# Patient Record
Sex: MALE | Race: WHITE | Employment: FULL TIME | URBAN - METROPOLITAN AREA
[De-identification: names, ages, dates, MRNs, and addresses within clinical notes are randomized per-mention and may not be internally consistent; named-entity substitution may affect disease eponyms.]

---

## 2017-04-02 DIAGNOSIS — E78.2 MIXED HYPERLIPIDEMIA: ICD-10-CM

## 2017-04-03 RX ORDER — SIMVASTATIN 40 MG
TABLET ORAL
Qty: 90 TABLET | Refills: 0 | OUTPATIENT
Start: 2017-04-03

## 2017-04-03 NOTE — TELEPHONE ENCOUNTER
Refused Prescriptions:                       Disp   Refills    simvastatin (ZOCOR) 40 MG tablet [Pharmacy*90 tab*0        Sig: TAKE 1 TABLET(40 MG) BY MOUTH AT BEDTIME  Refused By: PAUL OAKES  Reason for Refusal: Appt required, please call patient    Pt last seen a year ago.   Pt due for ov  Cecilees  [phone] (home) [phone] (work)

## 2017-04-15 ENCOUNTER — OFFICE VISIT (OUTPATIENT)
Dept: FAMILY MEDICINE | Facility: CLINIC | Age: 47
End: 2017-04-15

## 2017-04-15 VITALS
BODY MASS INDEX: 28.99 KG/M2 | OXYGEN SATURATION: 98 % | RESPIRATION RATE: 16 BRPM | TEMPERATURE: 98.4 F | HEART RATE: 72 BPM | DIASTOLIC BLOOD PRESSURE: 72 MMHG | HEIGHT: 65 IN | WEIGHT: 174 LBS | SYSTOLIC BLOOD PRESSURE: 112 MMHG

## 2017-04-15 DIAGNOSIS — Z71.89 ACP (ADVANCE CARE PLANNING): ICD-10-CM

## 2017-04-15 DIAGNOSIS — Z80.42 FH: PROSTATE CANCER: ICD-10-CM

## 2017-04-15 DIAGNOSIS — E78.2 MIXED HYPERLIPIDEMIA: Primary | ICD-10-CM

## 2017-04-15 PROCEDURE — 84153 ASSAY OF PSA TOTAL: CPT | Mod: 90 | Performed by: FAMILY MEDICINE

## 2017-04-15 PROCEDURE — 99396 PREV VISIT EST AGE 40-64: CPT | Performed by: FAMILY MEDICINE

## 2017-04-15 PROCEDURE — 36415 COLL VENOUS BLD VENIPUNCTURE: CPT | Performed by: FAMILY MEDICINE

## 2017-04-15 PROCEDURE — 80061 LIPID PANEL: CPT | Mod: 90 | Performed by: FAMILY MEDICINE

## 2017-04-15 PROCEDURE — 80053 COMPREHEN METABOLIC PANEL: CPT | Mod: 90 | Performed by: FAMILY MEDICINE

## 2017-04-15 RX ORDER — SIMVASTATIN 40 MG
40 TABLET ORAL AT BEDTIME
Qty: 90 TABLET | Refills: 3 | Status: SHIPPED | OUTPATIENT
Start: 2017-04-15 | End: 2018-05-22

## 2017-04-15 NOTE — MR AVS SNAPSHOT
After Visit Summary   4/15/2017    Yong Bryson    MRN: [id number]           Patient Information     Date Of Birth          1970        Visit Information        Provider Department      4/15/2017 9:45 AM Zane Zamorano MD [facility]        Today's Diagnoses     Mixed hyperlipidemia    -  1    FH: prostate cancer        ACP (advance care planning)           Follow-ups after your visit        Follow-up notes from your care team     Return in about 1 year (around 4/15/2018).      Who to contact     If you have questions or need follow up information about today's clinic visit or your schedule please contact [facility] directly at [phone].  Normal or non-critical lab and imaging results will be communicated to you by HolyTransactionhart, letter or phone within 4 business days after the clinic has received the results. If you do not hear from us within 7 days, please contact the clinic through HolyTransactionhart or phone. If you have a critical or abnormal lab result, we will notify you by phone as soon as possible.  Submit refill requests through 800APP or call your pharmacy and they will forward the refill request to us. Please allow 3 business days for your refill to be completed.          Additional Information About Your Visit        MyChart Information     800APP gives you secure access to your electronic health record. If you see a primary care provider, you can also send messages to your care team and make appointments. If you have questions, please call your primary care clinic.  If you do not have a primary care provider, please call [phone] and they will assist you.        Care EveryWhere ID     This is your Care EveryWhere ID. This could be used by other organizations to access your [place] medical records  [id number]        Your Vitals Were     Pulse Temperature Respirations Height Pulse Oximetry BMI (Body Mass Index)    72 98.4  F (36.9  " C) (Oral) 16 1.651 m (5' 5\") 98% 28.96 kg/m2       Blood Pressure from Last 3 Encounters:   04/15/17 112/72   03/09/16 118/80   12/23/14 140/90    Weight from Last 3 Encounters:   04/15/17 78.9 kg (174 lb)   03/09/16 79.5 kg (175 lb 4 oz)   12/23/14 78.1 kg (172 lb 3.2 oz)              We Performed the Following     COMPREHENSIVE METABOLIC PANEL (QUEST) XCMP     HCL PSA, SCREENING (QUEST)     Lipid Profile (QUEST)     VENOUS COLLECTION          Where to get your medicines      These medications were sent to [facility] [id number] - [address] AT [facility] [address]  [address] [place] [address] [address], [facility] [address]     Phone:  [phone]     simvastatin 40 MG tablet          Primary Care Provider Office Phone # Fax #    Zane Zamorano [phone]       [facility] [address] [facility]   [facility] [address]        Thank you!     Thank you for choosing [facility]  for your care. Our goal is always to provide you with excellent care. Hearing back from our patients is one way we can continue to improve our services. Please take a few minutes to complete the written survey that you may receive in the mail after your visit with us. Thank you!             Your Updated Medication List - Protect others around you: Learn how to safely use, store and throw away your medicines at www.disposemymeds.org.          This list is accurate as of: 4/15/17  9:52 AM.  Always use your most recent med list.                   Brand Name Dispense Instructions for use    simvastatin 40 MG tablet    ZOCOR    90 tablet    Take 1 tablet (40 mg) by mouth At Bedtime         "

## 2017-04-15 NOTE — PROGRESS NOTES
SUBJECTIVE:                                                    Yong Bryson is a 46 year old male who presents to clinic today for the following health issues:        Hyperlipidemia Follow-Up      Rate your low fat/cholesterol diet?: good    Taking statin?  Yes, no muscle aches from statin    Other lipid medications/supplements?:  none       Amount of exercise or physical activity: 4-5 days/week for an average of 15-30 minutes    Problems taking medications regularly: No    Medication side effects: none    Diet: regular (no restrictions)          Problem list and histories reviewed & adjusted, as indicated.  Additional history: as documented    Patient Active Problem List   Diagnosis     Mixed hyperlipidemia     Allergic rhinitis     FAM HX-CARDIOVAS DIS NEC     FHx: celiac disease     Health Care Home     ACP (advance care planning)     FH: prostate cancer     Past Surgical History:   Procedure Laterality Date     PHOTOREFRACTIVE KERATECTOMY  2008    bilateral       Social History   Substance Use Topics     Smoking status: Never Smoker     Smokeless tobacco: Never Used     Alcohol use 10.0 oz/week     20 drink(s) per week      Comment: 2-3 drinks per day     Family History   Problem Relation Age of Onset     Genetic Disorder Mother      Addisons     CEREBROVASCULAR DISEASE Mother      Hypertension Father      Lipids Father      Prostate Cancer Father      DIABETES Maternal Grandfather      GASTROINTESTINAL DISEASE Daughter      celiac     C.A.D. No family hx of          Current Outpatient Prescriptions   Medication Sig Dispense Refill     simvastatin (ZOCOR) 40 MG tablet Take 1 tablet (40 mg) by mouth At Bedtime 90 tablet 3     [DISCONTINUED] simvastatin (ZOCOR) 40 MG tablet Take 1 tablet (40 mg) by mouth At Bedtime 90 tablet 3     Allergies   Allergen Reactions     Sulfa Drugs      Recent Labs   Lab Test  03/12/16   0909  12/23/14   1211  11/13/13   1632  08/26/12   0915   06/17/10   1156  06/19/09   0852   LDL   "73  86  72  88   < >  88  90   HDL  34*  51  45  47   < >  41  46   TRIG  71  98  48  68   < >  108  77   ALT  15  20  18  27   < >  24  27   CR  1.03  1.05  1.06  0.97   < >  1.09  0.96   GFRESTIMATED  89  87  87  86   < >  76  89   GFRESTBLACK   --    --    --   >90   --   >90  >90   POTASSIUM  3.9  3.9  3.8  4.0   < >  4.2  4.5   TSH   --    --    --    --    --   2.18  1.46    < > = values in this interval not displayed.      BP Readings from Last 3 Encounters:   04/15/17 112/72   03/09/16 118/80   12/23/14 140/90    Wt Readings from Last 3 Encounters:   04/15/17 78.9 kg (174 lb)   03/09/16 79.5 kg (175 lb 4 oz)   12/23/14 78.1 kg (172 lb 3.2 oz)                    ROS:  Constitutional, HEENT, cardiovascular, pulmonary, gi and gu systems are negative, except as otherwise noted.    OBJECTIVE:                                                    /72 (BP Location: Left arm, Patient Position: Chair, Cuff Size: Adult Large)  Pulse 72  Temp 98.4  F (36.9  C) (Oral)  Resp 16  Ht 1.651 m (5' 5\")  Wt 78.9 kg (174 lb)  SpO2 98%  BMI 28.96 kg/m2  Body mass index is 28.96 kg/(m^2).   GENERAL: healthy, alert and no distress  NECK: no adenopathy, no asymmetry, masses, or scars and thyroid normal to palpation  RESP: lungs clear to auscultation - no rales, rhonchi or wheezes  CV: regular rate and rhythm, normal S1 S2, no S3 or S4, no murmur, click or rub, no peripheral edema and peripheral pulses strong  ABDOMEN: soft, nontender, no hepatosplenomegaly, no masses and bowel sounds normal  MS: no gross musculoskeletal defects noted, no edema    Diagnostic Test Results:  none      ASSESSMENT:                                                        PLAN:                                                    (E78.2) Mixed hyperlipidemia  (primary encounter diagnosis)  Comment: controlled pending labs  Plan: Lipid Profile (QUEST), COMPREHENSIVE METABOLIC         PANEL (QUEST) XCMP, VENOUS COLLECTION,         simvastatin (ZOCOR) " "40 MG tablet        continue current medications at current doses     (Z80.42) FH: prostate cancer  Comment: father in radiation  Plan: HCL PSA, SCREENING (QUEST), VENOUS COLLECTION        Baseline check in 40's recommended    (Z71.89) ACP (advance care planning)  Comment:   Plan:     BMI:   Estimated body mass index is 28.96 kg/(m^2) as calculated from the following:    Height as of this encounter: 1.651 m (5' 5\").    Weight as of this encounter: 78.9 kg (174 lb).   Weight management plan: Discussed healthy diet and exercise guidelines and patient will follow up in 12 months in clinic to re-evaluate.      FUTURE APPOINTMENTS:       - Follow-up visit in 12 mo  Work on weight loss  Regular exercise    Zane Zamorano MD  [facility]      "

## 2017-04-15 NOTE — NURSING NOTE
Patient is here for a recheck of their medication.  Pre-Visit Screening :  Immunizations : up to date    Colonoscopy : na  Mammogram : na  Asthma Action Test/Plan : na  PHQ9/GAD7 :  na  Pulse - regular    Medication Reconciliation: complete      CLASSIFICATION OF OVERWEIGHT AND OBESITY BY BMI                         Obesity Class           BMI(kg/m2)  Underweight                                    < 18.5  Normal                                         18.5-24.9  Overweight                                     25.0-29.9  OBESITY                     I                  30.0-34.9                              II                 35.0-39.9  EXTREME OBESITY             III                >40                             Patient's  BMI Body mass index is 28.96 kg/(m^2).  http://hin.nhlbi.nih.gov/menuplanner/menu.cgi  Questioned patient about current smoking habits.  Pt. has never smoked.

## 2017-04-16 LAB
ABBOTT PSA - QUEST: 0.7 NG/ML
ALBUMIN SERPL-MCNC: 4.7 G/DL (ref 3.6–5.1)
ALBUMIN/GLOB SERPL: 1.7 (CALC) (ref 1–2.5)
ALP SERPL-CCNC: 68 U/L (ref 40–115)
ALT SERPL-CCNC: 15 U/L (ref 9–46)
AST SERPL-CCNC: 19 U/L (ref 10–40)
BILIRUB SERPL-MCNC: 0.9 MG/DL (ref 0.2–1.2)
BUN SERPL-MCNC: 11 MG/DL (ref 7–25)
BUN/CREATININE RATIO: NORMAL (CALC) (ref 6–22)
CALCIUM SERPL-MCNC: 9.7 MG/DL (ref 8.6–10.3)
CHLORIDE SERPLBLD-SCNC: 102 MMOL/L (ref 98–110)
CHOLEST SERPL-MCNC: 147 MG/DL (ref 125–200)
CHOLEST/HDLC SERPL: 3.7 (CALC)
CO2 SERPL-SCNC: 28 MMOL/L (ref 20–31)
CREAT SERPL-MCNC: 1.16 MG/DL (ref 0.6–1.35)
EGFR AFRICAN AMERICAN - QUEST: 88 ML/MIN/1.73M2
GFR SERPL CREATININE-BSD FRML MDRD: 76 ML/MIN/1.73M2
GLOBULIN, CALCULATED - QUEST: 2.7 G/DL (CALC) (ref 1.9–3.7)
GLUCOSE - QUEST: 93 MG/DL (ref 65–99)
HDLC SERPL-MCNC: 40 MG/DL
LDLC SERPL CALC-MCNC: 92 MG/DL (CALC)
NONHDLC SERPL-MCNC: 107 MG/DL (CALC)
POTASSIUM SERPL-SCNC: 4 MMOL/L (ref 3.5–5.3)
PROT SERPL-MCNC: 7.4 G/DL (ref 6.1–8.1)
SODIUM SERPL-SCNC: 141 MMOL/L (ref 135–146)
TRIGL SERPL-MCNC: 73 MG/DL

## 2017-08-23 ENCOUNTER — OFFICE VISIT (OUTPATIENT)
Dept: FAMILY MEDICINE | Facility: CLINIC | Age: 47
End: 2017-08-23

## 2017-08-23 VITALS
BODY MASS INDEX: 28.06 KG/M2 | TEMPERATURE: 98.1 F | HEART RATE: 64 BPM | DIASTOLIC BLOOD PRESSURE: 84 MMHG | WEIGHT: 168.4 LBS | OXYGEN SATURATION: 98 % | SYSTOLIC BLOOD PRESSURE: 132 MMHG | HEIGHT: 65 IN

## 2017-08-23 DIAGNOSIS — K64.5 THROMBOSED EXTERNAL HEMORRHOIDS: Primary | ICD-10-CM

## 2017-08-23 PROCEDURE — 99213 OFFICE O/P EST LOW 20 MIN: CPT | Performed by: FAMILY MEDICINE

## 2017-08-23 NOTE — MR AVS SNAPSHOT
"              After Visit Summary   8/23/2017    Yong Bryson    MRN: [id number]           Patient Information     Date Of Birth          1970        Visit Information        Provider Department      8/23/2017 4:30 PM Zane Zamorano MD [facility]        Today's Diagnoses     Thrombosed external hemorrhoids    -  1       Follow-ups after your visit        Who to contact     If you have questions or need follow up information about today's clinic visit or your schedule please contact [facility] directly at [phone].  Normal or non-critical lab and imaging results will be communicated to you by Contests4Causeshart, letter or phone within 4 business days after the clinic has received the results. If you do not hear from us within 7 days, please contact the clinic through Contests4Causeshart or phone. If you have a critical or abnormal lab result, we will notify you by phone as soon as possible.  Submit refill requests through [employer] or call your pharmacy and they will forward the refill request to us. Please allow 3 business days for your refill to be completed.          Additional Information About Your Visit        MyChart Information     [employer] gives you secure access to your electronic health record. If you see a primary care provider, you can also send messages to your care team and make appointments. If you have questions, please call your primary care clinic.  If you do not have a primary care provider, please call [phone] and they will assist you.        Care EveryWhere ID     This is your Care EveryWhere ID. This could be used by other organizations to access your [place] medical records  [id number]        Your Vitals Were     Pulse Temperature Height Pulse Oximetry BMI (Body Mass Index)       64 98.1  F (36.7  C) (Oral) 1.651 m (5' 5\") 98% 28.02 kg/m2        Blood Pressure from Last 3 Encounters:   08/23/17 132/84   04/15/17 112/72   03/09/16 118/80    " Weight from Last 3 Encounters:   08/23/17 76.4 kg (168 lb 6.4 oz)   04/15/17 78.9 kg (174 lb)   03/09/16 79.5 kg (175 lb 4 oz)              Today, you had the following     No orders found for display       Primary Care Provider Office Phone # Fax #    Zane Christo Zamorano [phone] [address] [facility] [address]  [facility] [address]        Equal Access to Services     [facility] Medical CenterEDGARDO : Hadii aad ku hadasho Soomaali, waaxda luqadaha, qaybta kaalmada adeegyada, waxay idiin hayaan adeeg kharash la'hilda . So [facility] [phone].    ATENCIÓN: Si habla español, tiene a edwards disposición servicios gratuitos de asistencia lingüística. LlFisher-Titus Medical Center [phone].    We comply with applicable federal civil rights laws and [place] laws. We do not discriminate on the basis of race, color, national origin, age, disability sex, sexual orientation or gender identity.            Thank you!     Thank you for choosing [facility]  for your care. Our goal is always to provide you with excellent care. Hearing back from our patients is one way we can continue to improve our services. Please take a few minutes to complete the written survey that you may receive in the mail after your visit with us. Thank you!             Your Updated Medication List - Protect others around you: Learn how to safely use, store and throw away your medicines at www.disposemymeds.org.          This list is accurate as of: 8/23/17  4:55 PM.  Always use your most recent med list.                   Brand Name Dispense Instructions for use Diagnosis    simvastatin 40 MG tablet    ZOCOR    90 tablet    Take 1 tablet (40 mg) by mouth At Bedtime    Mixed hyperlipidemia

## 2017-08-23 NOTE — PROGRESS NOTES
"MALKA Bryson is a 47 year old male who presents with complaint of constipation starting on [employer] canoe trip and then external hemorrhoids present for 1 week(s). NO blood. Slight discomfort but no severe pain. Slight itch    Pt has been using OTC hemorrhoid cream and helps itching but not going away    Stool pattern: q1-2 days  Stool Consistency: soft formed  Risk factors: no known risk factors     Past Medical History:   Diagnosis Date     Mixed hyperlipidemia      Past Surgical History:   Procedure Laterality Date     PHOTOREFRACTIVE KERATECTOMY  2008    bilateral     Current Outpatient Prescriptions   Medication Sig Dispense Refill     simvastatin (ZOCOR) 40 MG tablet Take 1 tablet (40 mg) by mouth At Bedtime 90 tablet 3       OBJECTIVE:  /84 (BP Location: Left arm, Patient Position: Chair, Cuff Size: Adult Large)  Pulse 64  Temp 98.1  F (36.7  C) (Oral)  Ht 1.651 m (5' 5\")  Wt 76.4 kg (168 lb 6.4 oz)  SpO2 98%  BMI 28.02 kg/m2  positive bowel sounds, soft, nontender  Pt with 1 cm thrombosed hemorrhoid at 5 o clock, nontender, nonfluctuant    ASSESSMENT:/PLAN:  Thrombosed external hemorrhoid-discussed natura;l history of this lesion, generally do not incise unless very painful as they tend to recur  Recommend frequent soaks, fiber to soften stools, call if not better in 1-2 [id number] recommend colon and rectal in that case  f/u if not improving or worsening  patient given instructions to go to emergency department immediately if worsening of symptoms and verbalizes this understanding    "

## 2017-08-23 NOTE — NURSING NOTE
oYng HYLTON Salazarpatricia is here today for hemorrhoids that started a week ago.    Pre-Visit Screening :  Immunizations : up to date  Colon Screening : NA  Asthma Action Test/Plan : NA  PHQ9/GAD7 :  NA    Pulse - regular  My Chart - accepts    CLASSIFICATION OF OVERWEIGHT AND OBESITY BY BMI                         Obesity Class           BMI(kg/m2)  Underweight                                    < 18.5  Normal                                         18.5-24.9  Overweight                                     25.0-29.9  OBESITY                     I                  30.0-34.9                              II                 35.0-39.9  EXTREME OBESITY             III                >40                             Patient's  BMI Body mass index is 28.02 kg/(m^2).  http://hin.nhlbi.nih.gov/menuplanner/menu.cgi  Questioned patient about current smoking habits.  Pt. has never smoked.    Kay Chiang, CMA

## 2018-04-26 ENCOUNTER — TRANSFERRED RECORDS (OUTPATIENT)
Dept: FAMILY MEDICINE | Facility: CLINIC | Age: 48
End: 2018-04-26

## 2018-05-22 DIAGNOSIS — E78.2 MIXED HYPERLIPIDEMIA: ICD-10-CM

## 2018-05-22 RX ORDER — SIMVASTATIN 40 MG
TABLET ORAL
Qty: 90 TABLET | Refills: 0 | OUTPATIENT
Start: 2018-05-22

## 2018-05-22 RX ORDER — SIMVASTATIN 40 MG
40 TABLET ORAL AT BEDTIME
Qty: 30 TABLET | Refills: 0 | COMMUNITY
Start: 2018-05-22 | End: 2018-06-27

## 2018-05-22 NOTE — TELEPHONE ENCOUNTER
[facility] in [place]  Simvastatin 30 days  Pt is due for a FASTING OV  [place]  [phone] (home) [phone] (work)

## 2018-06-27 DIAGNOSIS — E78.2 MIXED HYPERLIPIDEMIA: ICD-10-CM

## 2018-06-27 RX ORDER — SIMVASTATIN 40 MG
40 TABLET ORAL AT BEDTIME
Qty: 30 TABLET | Refills: 0 | COMMUNITY
Start: 2018-06-27 | End: 2018-07-26

## 2018-06-27 NOTE — TELEPHONE ENCOUNTER
Yong called clinic support asking if he could get another short rx sent to the pharmacy of the following    Pending Prescriptions:                       Disp   Refills    simvastatin (ZOCOR) 40 MG tablet          30 tab*0            Sig: Take 1 tablet (40 mg) by mouth At Bedtime    Pt has an office visit scheduled for 7-26-18 and doesn't have enough mediation to make it to appointment, another 30 was sent in today for him

## 2018-07-26 ENCOUNTER — OFFICE VISIT (OUTPATIENT)
Dept: FAMILY MEDICINE | Facility: CLINIC | Age: 48
End: 2018-07-26

## 2018-07-26 VITALS
DIASTOLIC BLOOD PRESSURE: 78 MMHG | SYSTOLIC BLOOD PRESSURE: 136 MMHG | HEART RATE: 76 BPM | TEMPERATURE: 98.4 F | BODY MASS INDEX: 29.82 KG/M2 | RESPIRATION RATE: 20 BRPM | HEIGHT: 65 IN | WEIGHT: 179 LBS

## 2018-07-26 DIAGNOSIS — Z80.42 FH: PROSTATE CANCER: ICD-10-CM

## 2018-07-26 DIAGNOSIS — E78.2 MIXED HYPERLIPIDEMIA: ICD-10-CM

## 2018-07-26 DIAGNOSIS — Z71.89 ACP (ADVANCE CARE PLANNING): ICD-10-CM

## 2018-07-26 DIAGNOSIS — Z00.00 ROUTINE GENERAL MEDICAL EXAMINATION AT A HEALTH CARE FACILITY: Primary | ICD-10-CM

## 2018-07-26 DIAGNOSIS — F43.21 GRIEF REACTION: ICD-10-CM

## 2018-07-26 PROCEDURE — 99396 PREV VISIT EST AGE 40-64: CPT | Performed by: FAMILY MEDICINE

## 2018-07-26 PROCEDURE — 84153 ASSAY OF PSA TOTAL: CPT | Mod: 90 | Performed by: FAMILY MEDICINE

## 2018-07-26 PROCEDURE — 36415 COLL VENOUS BLD VENIPUNCTURE: CPT | Performed by: FAMILY MEDICINE

## 2018-07-26 PROCEDURE — 80053 COMPREHEN METABOLIC PANEL: CPT | Mod: 90 | Performed by: FAMILY MEDICINE

## 2018-07-26 PROCEDURE — 80061 LIPID PANEL: CPT | Mod: 90 | Performed by: FAMILY MEDICINE

## 2018-07-26 RX ORDER — SIMVASTATIN 40 MG
40 TABLET ORAL AT BEDTIME
Qty: 90 TABLET | Refills: 3 | Status: SHIPPED | OUTPATIENT
Start: 2018-07-26 | End: 2019-07-27

## 2018-07-26 NOTE — MR AVS SNAPSHOT
After Visit Summary   7/26/2018    Yong Bryson    MRN: [id number]           Patient Information     Date Of Birth          1970        Visit Information        Provider Department      7/26/2018 11:00 AM Zane Zamorano MD [facility]        Today's Diagnoses     Routine general medical examination at a health care facility    -  1    Mixed hyperlipidemia        FH: prostate cancer        Grief reaction           Follow-ups after your visit        Follow-up notes from your care team     Return in about 1 year (around 7/26/2019).      Who to contact     If you have questions or need follow up information about today's clinic visit or your schedule please contact [facility] directly at [phone].  Normal or non-critical lab and imaging results will be communicated to you by Domobioshart, letter or phone within 4 business days after the clinic has received the results. If you do not hear from us within 7 days, please contact the clinic through Domobioshart or phone. If you have a critical or abnormal lab result, we will notify you by phone as soon as possible.  Submit refill requests through [employer] or call your pharmacy and they will forward the refill request to us. Please allow 3 business days for your refill to be completed.          Additional Information About Your Visit        MyChart Information     [employer] gives you secure access to your electronic health record. If you see a primary care provider, you can also send messages to your care team and make appointments. If you have questions, please call your primary care clinic.  If you do not have a primary care provider, please call [phone] and they will assist you.        Care EveryWhere ID     This is your Care EveryWhere ID. This could be used by other organizations to access your [place] medical records  [id number]        Your Vitals Were     Pulse Temperature Respirations Height  "BMI (Body Mass Index)       76 98.4  F (36.9  C) (Oral) 20 1.638 m (5' 4.5\") 30.25 kg/m2        Blood Pressure from Last 3 Encounters:   07/26/18 136/78   08/23/17 132/84   04/15/17 112/72    Weight from Last 3 Encounters:   07/26/18 81.2 kg (179 lb)   08/23/17 76.4 kg (168 lb 6.4 oz)   04/15/17 78.9 kg (174 lb)              We Performed the Following     COMPREHENSIVE METABOLIC PANEL (QUEST) XCMP     HCL PSA, SCREENING (QUEST)     Lipid Profile (QUEST)     VENOUS COLLECTION          Where to get your medicines      These medications were sent to [facility] [address] [place] [address] E AT [facility] [address]  [address] [place] [address], [facility] [address]     Phone:  [phone]     simvastatin 40 MG tablet          Primary Care Provider Office Phone # Fax #    Zane Zamorano [phone] [address]   [facility] [address]        Equal Access to Services     KWASI GILLESPIE AH: Hadii aad ku hadasho Soomaali, waaxda luqadaha, qaybta kaalmada adeegyada, emily clarke haychalinon ruth ann soto . So [facility] [phone].    ATENCIÓN: Si habla español, tiene a edwards disposición servicios gratuitos de asistencia lingüística. Llame al [phone].    We comply with applicable federal civil rights laws and [place] laws. We do not discriminate on the basis of race, color, national origin, age, disability, sex, sexual orientation, or gender identity.            Thank you!     Thank you for choosing [facility]  for your care. Our goal is always to provide you with excellent care. Hearing back from our patients is one way we can continue to improve our services. Please take a few minutes to complete the written survey that you may receive in the mail after your visit with us. Thank you!             Your Updated Medication List - Protect others around you: Learn how to safely use, store and throw away your medicines at www.disposemymeds.org. "          This list is accurate as of 7/26/18 11:28 AM.  Always use your most recent med list.                   Brand Name Dispense Instructions for use Diagnosis    simvastatin 40 MG tablet    ZOCOR    90 tablet    Take 1 tablet (40 mg) by mouth At Bedtime    Mixed hyperlipidemia

## 2018-07-26 NOTE — NURSING NOTE
Yong Bryson is here for a CPX.    Pre-visit planning  Immunizations -up to date  Colonoscopy -na  Mammogram -  Asthma test --  PHQ9 -  YAZMIN 7 -    Questioned patient about current smoking habits.  Pt. has never smoked.  Body mass index is 30.25 kg/(m^2).  PULSE regular  My Chart: active  CLASSIFICATION OF OVERWEIGHT AND OBESITY BY BMI                        Obesity Class           BMI(kg/m2)  Underweight                                    < 18.5  Normal                                         18.5-24.9  Overweight                                     25.0-29.9  OBESITY                     I                  30.0-34.9                             II                 35.0-39.9  EXTREME OBESITY             III                >40                            Patient's  BMI Body mass index is 30.25 kg/(m^2).  Http://hin.nhlbi.nih.gov/menuplanner/menu.cgi

## 2018-07-26 NOTE — PROGRESS NOTES
SUBJECTIVE:   CC: Yong Bryson is an 48 year old male who presents for preventative health visit.     Healthy Habits:    Do you get at least three servings of calcium containing foods daily (dairy, green leafy vegetables, etc.)? yes    Amount of exercise or daily activities, outside of work: 0 day(s) per week    Problems taking medications regularly No    Medication side effects: No    Have you had an eye exam in the past two years? yes    Do you see a dentist twice per year? yes    Do you have sleep apnea, excessive snoring or daytime drowsiness?no       Mother passed in january    Today's PHQ-2 Score:   PHQ-2 ( 1999 Pfizer) 7/26/2018 3/9/2016   Q1: Little interest or pleasure in doing things 0 0   Q2: Feeling down, depressed or hopeless 0 0   PHQ-2 Score 0 0       Abuse: Current or Past(Physical, Sexual or Emotional)- No  Do you feel safe in your environment - Yes    Social History   Substance Use Topics     Smoking status: Never Smoker     Smokeless tobacco: Never Used     Alcohol use 10.0 oz/week     20 drink(s) per week      Comment: 2-3 drinks per day      If you drink alcohol do you typically have >3 drinks per day or >7 drinks per week? Has been drinking more since mothers death-will be working on this                      Last PSA:   Abbott PSA   Date Value Ref Range Status   04/15/2017 0.7 < OR = 4.0 ng/mL Final     Comment:        This test was performed using the Siemens  chemiluminescent method. Values obtained from  different assay methods cannot be used  interchangeably. PSA levels, regardless of  value, should not be interpreted as absolute  evidence of the presence or absence of disease.            Reviewed orders with patient. Reviewed health maintenance and updated orders accordingly - Yes  BP Readings from Last 3 Encounters:   07/26/18 136/78   08/23/17 132/84   04/15/17 112/72    Wt Readings from Last 3 Encounters:   07/26/18 81.2 kg (179 lb)   08/23/17 76.4 kg (168 lb 6.4 oz)   04/15/17 78.9 kg  (174 lb)                  Patient Active Problem List   Diagnosis     Mixed hyperlipidemia     Allergic rhinitis     FHx: celiac disease     Health Care Home     ACP (advance care planning)     FH: prostate cancer     Past Surgical History:   Procedure Laterality Date     PHOTOREFRACTIVE KERATECTOMY  2008    bilateral       Social History   Substance Use Topics     Smoking status: Never Smoker     Smokeless tobacco: Never Used     Alcohol use 10.0 oz/week     20 drink(s) per week      Comment: 2-3 drinks per day     Family History   Problem Relation Age of Onset     Genetic Disorder Mother      Addisons     Cerebrovascular Disease Mother      Coronary Artery Disease Mother      Hypertension Father      Lipids Father      Prostate Cancer Father      Diabetes Maternal Grandfather      GASTROINTESTINAL DISEASE Daughter      celiac     C.A.D. No family hx of          Current Outpatient Prescriptions   Medication Sig Dispense Refill     simvastatin (ZOCOR) 40 MG tablet Take 1 tablet (40 mg) by mouth At Bedtime 30 tablet 0     Allergies   Allergen Reactions     Sulfa Drugs      Recent Labs   Lab Test  04/15/17   1012  03/12/16   0909  12/23/14   1211   08/26/12   0915   06/17/10   1156   LDL  92  73  86   < >  88   < >  88   HDL  40  34*  51   < >  47   < >  41   TRIG  73  71  98   < >  68   < >  108   ALT  15  15  20   < >  27   < >  24   CR  1.16  1.03  1.05   < >  0.97   < >  1.09   GFRESTIMATED  76  89  87   < >  86   < >  76   GFRESTBLACK   --    --    --    --   >90   --   >90   POTASSIUM  4.0  3.9  3.9   < >  4.0   < >  4.2   TSH   --    --    --    --    --    --   2.18    < > = values in this interval not displayed.        Reviewed and updated as needed this visit by clinical staff  Tobacco  Allergies  Meds         Reviewed and updated as needed this visit by Provider        Past Medical History:   Diagnosis Date     Mixed hyperlipidemia       Past Surgical History:   Procedure Laterality Date      "PHOTOREFRACTIVE KERATECTOMY  2008    bilateral       ROS:  CONSTITUTIONAL: NEGATIVE for fever, chills, change in weight  INTEGUMENTARY/SKIN: NEGATIVE for worrisome rashes, moles or lesions  EYES: NEGATIVE for vision changes or irritation  ENT: NEGATIVE for ear, mouth and throat problems  RESP: NEGATIVE for significant cough or SOB  CV: NEGATIVE for chest pain, palpitations or peripheral edema  GI: NEGATIVE for nausea, abdominal pain, heartburn, or change in bowel habits   male: negative for dysuria, hematuria, decreased urinary stream, erectile dysfunction, urethral discharge  MUSCULOSKELETAL: NEGATIVE for significant arthralgias or myalgia  NEURO: NEGATIVE for weakness, dizziness or paresthesias  PSYCHIATRIC: NEGATIVE for changes in mood or affect    OBJECTIVE:   /78 (BP Location: Left arm, Patient Position: Chair, Cuff Size: Adult Regular)  Pulse 76  Temp 98.4  F (36.9  C) (Oral)  Resp 20  Ht 1.638 m (5' 4.5\")  Wt 81.2 kg (179 lb)  BMI 30.25 kg/m2  EXAM:  GENERAL: healthy, alert and no distress  EYES: Eyes grossly normal to inspection, PERRL and conjunctivae and sclerae normal  HENT: ear canals and TM's normal, nose and mouth without ulcers or lesions  NECK: no adenopathy, no asymmetry, masses, or scars and thyroid normal to palpation  RESP: lungs clear to auscultation - no rales, rhonchi or wheezes  CV: regular rate and rhythm, normal S1 S2, no S3 or S4, no murmur, click or rub, no peripheral edema and peripheral pulses strong  ABDOMEN: soft, nontender, no hepatosplenomegaly, no masses and bowel sounds normal   (male): normal male genitalia without lesions or urethral discharge, no hernia  RECTAL (male): deferred  MS: no gross musculoskeletal defects noted, no edema  SKIN: no suspicious lesions or rashes  NEURO: Normal strength and tone, mentation intact and speech normal  PSYCH: mentation appears normal and affect flat  LYMPH: no cervical, supraclavicular, axillary, or inguinal " "adenopathy        ASSESSMENT/PLAN:   (Z00.00) Routine general medical examination at a health care facility  (primary encounter diagnosis)  Comment: discussed preventitive healthcare   Plan: COMPREHENSIVE METABOLIC PANEL ([facility]) XCMP,         VENOUS COLLECTION        Continue to work on healthy diet and exercise, discussed healthy habits     (E78.2) Mixed hyperlipidemia  Comment: controlled  Plan: simvastatin (ZOCOR) 40 MG tablet, VENOUS         COLLECTION, Lipid Profile ([facility])        continue current medications at current doses     (Z80.42) FH: prostate cancer  Comment:   Plan: VENOUS COLLECTION            (F43.20) Grief reaction  Comment: discussed normal and abnormal symptoms -he is somewhat depressed but has plans to work on healthy habits  Plan: exercise, eat well, sleep well, seek social gatherings, f/u if not improving or worsening and could consider SSRI    COUNSELING:  Reviewed preventive health counseling, as reflected in patient instructions       Regular exercise       Healthy diet/nutrition       Vision screening       Colon cancer screening       Prostate cancer screening    BP Readings from Last 1 Encounters:   07/26/18 136/78     Estimated body mass index is 30.25 kg/(m^2) as calculated from the following:    Height as of this encounter: 1.638 m (5' 4.5\").    Weight as of this encounter: 81.2 kg (179 lb).    BP Screening:   Last 3 BP Readings:    BP Readings from Last 3 Encounters:   07/26/18 136/78   08/23/17 132/84   04/15/17 112/72       The following was recommended to the patient:  Re-screen BP within a year and recommended lifestyle modifications  Weight management plan: Discussed healthy diet and exercise guidelines and patient will follow up in 6 months in clinic to re-evaluate.     reports that he has never smoked. He has never used smokeless tobacco.      Counseling Resources:  ATP IV Guidelines  Pooled Cohorts Equation Calculator  FRAX Risk Assessment  ICSI Preventive Guidelines  Dietary " Guidelines for Americans, 2010  USDA's MyPlate  ASA Prophylaxis  Lung CA Screening    Zane Zamorano MD  [facility]

## 2018-07-27 LAB
ABBOTT PSA - QUEST: 0.6 NG/ML
ALBUMIN SERPL-MCNC: 4.7 G/DL (ref 3.6–5.1)
ALBUMIN/GLOB SERPL: 1.9 (CALC) (ref 1–2.5)
ALP SERPL-CCNC: 74 U/L (ref 40–115)
ALT SERPL-CCNC: 31 U/L (ref 9–46)
AST SERPL-CCNC: 25 U/L (ref 10–40)
BILIRUB SERPL-MCNC: 0.5 MG/DL (ref 0.2–1.2)
BUN SERPL-MCNC: 11 MG/DL (ref 7–25)
BUN/CREATININE RATIO: NORMAL (CALC) (ref 6–22)
CALCIUM SERPL-MCNC: 9.4 MG/DL (ref 8.6–10.3)
CHLORIDE SERPLBLD-SCNC: 103 MMOL/L (ref 98–110)
CHOLEST SERPL-MCNC: 164 MG/DL
CHOLEST/HDLC SERPL: 3.4 (CALC)
CO2 SERPL-SCNC: 30 MMOL/L (ref 20–31)
CREAT SERPL-MCNC: 1.03 MG/DL (ref 0.6–1.35)
EGFR AFRICAN AMERICAN - QUEST: 100 ML/MIN/1.73M2
GFR SERPL CREATININE-BSD FRML MDRD: 87 ML/MIN/1.73M2
GLOBULIN, CALCULATED - QUEST: 2.5 G/DL (CALC) (ref 1.9–3.7)
GLUCOSE - QUEST: 92 MG/DL (ref 65–99)
HDLC SERPL-MCNC: 48 MG/DL
LDLC SERPL CALC-MCNC: 97 MG/DL (CALC)
NONHDLC SERPL-MCNC: 116 MG/DL (CALC)
POTASSIUM SERPL-SCNC: 4.7 MMOL/L (ref 3.5–5.3)
PROT SERPL-MCNC: 7.2 G/DL (ref 6.1–8.1)
SODIUM SERPL-SCNC: 139 MMOL/L (ref 135–146)
TRIGL SERPL-MCNC: 95 MG/DL

## 2019-04-30 ENCOUNTER — E-VISIT (OUTPATIENT)
Dept: FAMILY MEDICINE | Facility: CLINIC | Age: 49
End: 2019-04-30

## 2019-04-30 DIAGNOSIS — Z53.9 ERRONEOUS ENCOUNTER--DISREGARD: Primary | ICD-10-CM

## 2019-05-08 ENCOUNTER — OFFICE VISIT (OUTPATIENT)
Dept: FAMILY MEDICINE | Facility: CLINIC | Age: 49
End: 2019-05-08

## 2019-05-08 VITALS
OXYGEN SATURATION: 97 % | HEIGHT: 65 IN | WEIGHT: 180.8 LBS | SYSTOLIC BLOOD PRESSURE: 138 MMHG | TEMPERATURE: 98.6 F | BODY MASS INDEX: 30.12 KG/M2 | HEART RATE: 69 BPM | DIASTOLIC BLOOD PRESSURE: 80 MMHG

## 2019-05-08 DIAGNOSIS — M25.562 ACUTE PAIN OF LEFT KNEE: Primary | ICD-10-CM

## 2019-05-08 PROCEDURE — 99213 OFFICE O/P EST LOW 20 MIN: CPT | Performed by: FAMILY MEDICINE

## 2019-05-08 ASSESSMENT — MIFFLIN-ST. JEOR: SCORE: 1626.98

## 2019-05-08 NOTE — NURSING NOTE
Yong is here for pain behind left knee for about two weeks.          Pre-visit Screening:  Immunizations:  up to date  Colonoscopy:  is up to date  Mammogram: NA  Asthma Action Test/Plan:  NA  PHQ9:  None  GAD7:  None  Questioned patient about current smoking habits Pt. has never smoked.  Ok to leave detailed message on voice mail for today's visit only Yes, phone # [phone]

## 2019-05-08 NOTE — PROGRESS NOTES
SUBJECTIVE: Yong Bryson is a 48 year old male who presents for left posterior knee pain for 2 weeks, worse in am or before movement.  He does feel a lump behind knee.  He has tried ibuprofen and heat which helped but symptoms return when he stops.  No shortness of breath NO chest pain  . NO rash    Patient Active Problem List   Diagnosis     Mixed hyperlipidemia     Allergic rhinitis     FHx: celiac disease     Health Care Home     ACP (advance care planning)     FH: prostate cancer     Past Medical History:   Diagnosis Date     Mixed hyperlipidemia      Family History   Problem Relation Age of Onset     Genetic Disorder Mother         Addisons     Cerebrovascular Disease Mother      Coronary Artery Disease Mother      Hypertension Father      Lipids Father      Prostate Cancer Father      Diabetes Maternal Grandfather      Gastrointestinal Disease Daughter         celiac     C.A.D. 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     Employer: [employer]   Social Needs     Financial resource strain: Not on file     Food insecurity:     Worry: Not on file     Inability: Not on file     Transportation needs:     Medical: Not on file     Non-medical: Not on file   Tobacco Use     Smoking status: Never Smoker     Smokeless tobacco: Never Used   Substance and Sexual Activity     Alcohol use: Yes     Alcohol/week: 10.0 oz     Types: 20 drink(s) per week     Comment: 2-3 drinks per day     Drug use: No     Sexual activity: Yes     Partners: Female     Birth control/protection: Surgical   Lifestyle     Physical activity:     Days per week: Not on file     Minutes per session: Not on file     Stress: Not on file   Relationships     Social connections:     Talks on phone:  "Not on file     Gets together: Not on file     Attends Baptism service: Not on file     Active member of club or organization: Not on file     Attends meetings of clubs or organizations: Not on file     Relationship status: Not on file     Intimate partner violence:     Fear of current or ex partner: Not on file     Emotionally abused: Not on file     Physically abused: Not on file     Forced sexual activity: Not on file   Other Topics Concern     Parent/sibling w/ CABG, MI or angioplasty before 65F 55M? Not Asked   Social History Narrative     Not on file     Past Surgical History:   Procedure Laterality Date     PHOTOREFRACTIVE KERATECTOMY  2008    bilateral       Current Outpatient Medications on File Prior to Visit:  simvastatin (ZOCOR) 40 MG tablet Take 1 tablet (40 mg) by mouth At Bedtime     No current facility-administered medications on file prior to visit.      Allergies: Sulfa drugs    Immunization History   Administered Date(s) Administered     HepB 02/14/1998, 03/13/1998, 08/07/1998     Influenza (IIV3) PF 08/22/2012     Influenza Vaccine IM 3yrs+ 4 Valent IIV4 11/13/2013     TD (ADULT, 7+) 08/07/2003     TDAP Vaccine (Boostrix) 08/11/2011        OBJECTIVE:   /80 (BP Location: Right arm, Patient Position: Sitting, Cuff Size: Adult Large)   Pulse 69   Temp 98.6  F (37  C) (Oral)   Ht 1.651 m (5' 5\")   Wt 82 kg (180 lb 12.8 oz)   SpO2 97%   BMI 30.09 kg/m     MS-fullness in left popliteal space, minimally tender, ROM good today  No calf tenderness or palpable cors, neg Homans    ASSESSMENT: /PLAN:   (M25.562) Acute pain of left knee  (primary encounter diagnosis)  Comment: apparent Bakers cyst-not large on exam today  Plan: recommend 1-2 weeks nsaids with food, heat, rest    If not better contact me for ortho referral     "

## 2019-07-27 DIAGNOSIS — E78.2 MIXED HYPERLIPIDEMIA: ICD-10-CM

## 2019-07-29 RX ORDER — SIMVASTATIN 40 MG
TABLET ORAL
Qty: 30 TABLET | Refills: 0 | COMMUNITY
Start: 2019-07-29 | End: 2019-08-20

## 2019-07-29 NOTE — TELEPHONE ENCOUNTER
Called pt in 30 day Simvastatin 40MG to [facility]. Pt due for fasting CPX or OV. Please call to schedule.    Thanks, Sangita

## 2019-08-20 ENCOUNTER — OFFICE VISIT (OUTPATIENT)
Dept: FAMILY MEDICINE | Facility: CLINIC | Age: 49
End: 2019-08-20

## 2019-08-20 VITALS
TEMPERATURE: 98.4 F | HEART RATE: 64 BPM | DIASTOLIC BLOOD PRESSURE: 74 MMHG | WEIGHT: 174.8 LBS | BODY MASS INDEX: 28.09 KG/M2 | SYSTOLIC BLOOD PRESSURE: 138 MMHG | HEIGHT: 66 IN | RESPIRATION RATE: 20 BRPM

## 2019-08-20 DIAGNOSIS — Z80.42 FH: PROSTATE CANCER: ICD-10-CM

## 2019-08-20 DIAGNOSIS — E78.2 MIXED HYPERLIPIDEMIA: ICD-10-CM

## 2019-08-20 DIAGNOSIS — Z00.00 ROUTINE GENERAL MEDICAL EXAMINATION AT A HEALTH CARE FACILITY: Primary | ICD-10-CM

## 2019-08-20 LAB
ALBUMIN SERPL-MCNC: 4.6 G/DL (ref 3.6–5.1)
ALBUMIN/GLOB SERPL: 1.8 {RATIO} (ref 1–2.5)
ALP SERPL-CCNC: 73 U/L (ref 33–130)
ALT 1742-6: 9 U/L (ref 5–30)
AST 1920-8: 15 U/L (ref 7–31)
BILIRUB SERPL-MCNC: 1.2 MG/DL (ref 0.2–1.2)
BUN SERPL-MCNC: 13 MG/DL (ref 7–25)
BUN/CREATININE RATIO: 11.5 (ref 6–22)
CALCIUM SERPL-MCNC: 9.4 MG/DL (ref 8.6–10.3)
CHLORIDE SERPLBLD-SCNC: 103.8 MMOL/L (ref 98–110)
CHOLEST SERPL-MCNC: 162 MG/DL (ref 0–199)
CHOLEST/HDLC SERPL: 3 {RATIO} (ref 0–5)
CO2 SERPL-SCNC: 31.7 MMOL/L (ref 20–32)
CREAT SERPL-MCNC: 1.13 MG/DL (ref 0.7–1.18)
GLOBULIN, CALCULATED - QUEST: 2.6 (ref 1.9–3.7)
GLUCOSE SERPL-MCNC: 100 MG/DL (ref 60–99)
HDLC SERPL-MCNC: 47 MG/DL (ref 40–150)
LDLC SERPL CALC-MCNC: 91 MG/DL (ref 0–130)
POTASSIUM SERPL-SCNC: 3.87 MMOL/L (ref 3.5–5.3)
PROT SERPL-MCNC: 7.2 G/DL (ref 6.1–8.1)
SODIUM SERPL-SCNC: 141.1 MMOL/L (ref 135–146)
TRIGL SERPL-MCNC: 121 MG/DL (ref 0–149)

## 2019-08-20 PROCEDURE — 80061 LIPID PANEL: CPT | Performed by: FAMILY MEDICINE

## 2019-08-20 PROCEDURE — 36415 COLL VENOUS BLD VENIPUNCTURE: CPT | Performed by: FAMILY MEDICINE

## 2019-08-20 PROCEDURE — 84153 ASSAY OF PSA TOTAL: CPT | Mod: 90 | Performed by: FAMILY MEDICINE

## 2019-08-20 PROCEDURE — 80053 COMPREHEN METABOLIC PANEL: CPT | Performed by: FAMILY MEDICINE

## 2019-08-20 PROCEDURE — 99396 PREV VISIT EST AGE 40-64: CPT | Performed by: FAMILY MEDICINE

## 2019-08-20 RX ORDER — SIMVASTATIN 40 MG
40 TABLET ORAL AT BEDTIME
Qty: 90 TABLET | Refills: 3 | Status: SHIPPED | OUTPATIENT
Start: 2019-08-20 | End: 2020-11-09

## 2019-08-20 SDOH — HEALTH STABILITY: MENTAL HEALTH: HOW OFTEN DO YOU HAVE 6 OR MORE DRINKS ON ONE OCCASION?: NEVER

## 2019-08-20 SDOH — HEALTH STABILITY: MENTAL HEALTH: HOW OFTEN DO YOU HAVE A DRINK CONTAINING ALCOHOL?: 4 OR MORE TIMES A WEEK

## 2019-08-20 SDOH — HEALTH STABILITY: MENTAL HEALTH: HOW MANY STANDARD DRINKS CONTAINING ALCOHOL DO YOU HAVE ON A TYPICAL DAY?: 1 OR 2

## 2019-08-20 ASSESSMENT — MIFFLIN-ST. JEOR: SCORE: 1602.7

## 2019-08-20 NOTE — PROGRESS NOTES
SUBJECTIVE:   CC: Yong Bryson is an 49 year old male who presents for preventive health visit.     Healthy Habits:    Do you get at least three servings of calcium containing foods daily (dairy, green leafy vegetables, etc.)? yes    Amount of exercise or daily activities, outside of work: 4 day(s) per week    Problems taking medications regularly No    Medication side effects: No    Have you had an eye exam in the past two years? yes    Do you see a dentist twice per year? yes    Do you have sleep apnea, excessive snoring or daytime drowsiness?no          Today's PHQ-2 Score:   PHQ-2 ( 1999 Pfizer) 5/8/2019 7/26/2018   Q1: Little interest or pleasure in doing things 0 0   Q2: Feeling down, depressed or hopeless 0 0   PHQ-2 Score 0 0       Abuse: Current or Past(Physical, Sexual or Emotional)- No  Do you feel safe in your environment? Yes    Social History     Tobacco Use     Smoking status: Never Smoker     Smokeless tobacco: Never Used   Substance Use Topics     Alcohol use: Yes     Alcohol/week: 10.0 oz     Types: 20 Standard drinks or equivalent per week     Frequency: 4 or more times a week     Drinks per session: 1 or 2     Binge frequency: Never     If you drink alcohol do you typically have >3 drinks per day or >7 drinks per week? No                      Last PSA:   Abbott PSA   Date Value Ref Range Status   07/26/2018 0.6 < OR = 4.0 ng/mL Final     Comment:     The total PSA value from this assay system is   standardized against the WHO standard. The test   result will be approximately 20% lower when compared   to the equimolar-standardized total PSA (Cezar   Bonnie). Comparison of serial PSA results should be   interpreted with this fact in mind.     This test was performed using the Siemens   chemiluminescent method. Values obtained from   different assay methods cannot be used  interchangeably. PSA levels, regardless of  value, should not be interpreted as absolute  evidence of the presence or absence  of disease.         Reviewed orders with patient. Reviewed health maintenance and updated orders accordingly - Yes  BP Readings from Last 3 Encounters:   08/20/19 138/74   05/08/19 138/80   07/26/18 136/78    Wt Readings from Last 3 Encounters:   08/20/19 79.3 kg (174 lb 12.8 oz)   05/08/19 82 kg (180 lb 12.8 oz)   07/26/18 81.2 kg (179 lb)                  Patient Active Problem List   Diagnosis     Mixed hyperlipidemia     Allergic rhinitis     FHx: celiac disease     Health Care Home     ACP (advance care planning)     FH: prostate cancer     Past Surgical History:   Procedure Laterality Date     PHOTOREFRACTIVE KERATECTOMY  2008    bilateral       Social History     Tobacco Use     Smoking status: Never Smoker     Smokeless tobacco: Never Used   Substance Use Topics     Alcohol use: Yes     Alcohol/week: 10.0 oz     Types: 20 Standard drinks or equivalent per week     Frequency: 4 or more times a week     Drinks per session: 1 or 2     Binge frequency: Never     Family History   Problem Relation Age of Onset     Genetic Disorder Mother         Addisons     Cerebrovascular Disease Mother      Coronary Artery Disease Mother      Hypertension Father      Lipids Father      Prostate Cancer Father      Diabetes Maternal Grandfather      Gastrointestinal Disease Daughter         celiac     C.A.D. No family hx of          Current Outpatient Medications   Medication Sig Dispense Refill     simvastatin (ZOCOR) 40 MG tablet Take 1 tablet (40 mg) by mouth At Bedtime 90 tablet 3     Allergies   Allergen Reactions     Sulfa Drugs      Recent Labs   Lab Test 07/26/18  1133 04/15/17  1012 03/12/16  0909  08/26/12  0915   LDL 97 92 73   < > 88   HDL 48 40 34*   < > 47   TRIG 95 73 71   < > 68   ALT 31 15 15   < > 27   CR 1.03 1.16 1.03   < > 0.97   GFRESTIMATED 87 76 89   < > 86   GFRESTBLACK  --   --   --   --  >90   POTASSIUM 4.7 4.0 3.9   < > 4.0    < > = values in this interval not displayed.        Reviewed and updated as  "needed this visit by clinical staff  Tobacco  Allergies  Meds  Problems         Reviewed and updated as needed this visit by Provider        Past Medical History:   Diagnosis Date     Mixed hyperlipidemia       Past Surgical History:   Procedure Laterality Date     PHOTOREFRACTIVE KERATECTOMY  2008    bilateral       ROS:  CONSTITUTIONAL: NEGATIVE for fever, chills, change in weight  INTEGUMENTARY/SKIN: NEGATIVE for worrisome rashes, moles or lesions  EYES: NEGATIVE for vision changes or irritation  ENT: NEGATIVE for ear, mouth and throat problems  RESP: NEGATIVE for significant cough or SOB  CV: NEGATIVE for chest pain, palpitations or peripheral edema  GI: NEGATIVE for nausea, abdominal pain, heartburn, or change in bowel habits   male: negative for dysuria, hematuria, decreased urinary stream, erectile dysfunction, urethral discharge  MUSCULOSKELETAL: NEGATIVE for significant arthralgias or myalgia  NEURO: NEGATIVE for weakness, dizziness or paresthesias  ENDOCRINE: NEGATIVE for temperature intolerance, skin/hair changes  HEME/ALLERGY/IMMUNE: NEGATIVE for bleeding problems  PSYCHIATRIC: NEGATIVE for changes in mood or affect    OBJECTIVE:   /74 (BP Location: Left arm, Patient Position: Chair, Cuff Size: Adult Regular)   Pulse 64   Temp 98.4  F (36.9  C) (Oral)   Resp 20   Ht 1.664 m (5' 5.5\")   Wt 79.3 kg (174 lb 12.8 oz)   BMI 28.65 kg/m    EXAM:  GENERAL: healthy, alert and no distress  EYES: Eyes grossly normal to inspection, PERRL and conjunctivae and sclerae normal  HENT: ear canals and TM's normal, nose and mouth without ulcers or lesions  NECK: no adenopathy, no asymmetry, masses, or scars and thyroid normal to palpation  RESP: lungs clear to auscultation - no rales, rhonchi or wheezes  CV: regular rate and rhythm, normal S1 S2, no S3 or S4, no murmur, click or rub, no peripheral edema and peripheral pulses strong  ABDOMEN: soft, nontender, no hepatosplenomegaly, no masses and bowel " "sounds normal   (male): normal male genitalia without lesions or urethral discharge, no hernia  RECTAL (male): deferred  MS: no gross musculoskeletal defects noted, no edema  SKIN: no suspicious lesions or rashes  NEURO: Normal strength and tone, mentation intact and speech normal  PSYCH: mentation appears normal, affect normal/bright        ASSESSMENT/PLAN:   (Z00.00) Routine general medical examination at a health care facility  (primary encounter diagnosis)  Comment: discussed preventitive healthcare   Plan: Continue to work on healthy diet and exercise, discussed healthy habits     (E78.2) Mixed hyperlipidemia  Comment: control uncertain  Plan: Lipid Panel ([facility]), Comprehensive Metobolic         Panel ([facility]), VENOUS COLLECTION, simvastatin         (ZOCOR) 40 MG tablet        continue current medications at current doses pending labs    (Z80.42) FH: prostate cancer  Comment:   Plan: HCL PSA, SCREENING ([facility]), VENOUS COLLECTION              COUNSELING:  Reviewed preventive health counseling, as reflected in patient instructions       Regular exercise       Healthy diet/nutrition       Vision screening    Estimated body mass index is 28.65 kg/m  as calculated from the following:    Height as of this encounter: 1.664 m (5' 5.5\").    Weight as of this encounter: 79.3 kg (174 lb 12.8 oz).    Weight management plan: Discussed healthy diet and exercise guidelines     reports that he has never smoked. He has never used smokeless tobacco.      Counseling Resources:  ATP IV Guidelines  Pooled Cohorts Equation Calculator  FRAX Risk Assessment  ICSI Preventive Guidelines  Dietary Guidelines for Americans, 2010  USDA's MyPlate  ASA Prophylaxis  Lung CA Screening    Zane Zamorano MD  [facility]  "

## 2019-08-20 NOTE — NURSING NOTE
Yong Bryson is here for a CPX.    Pre-visit planning  Immunizations -up to date  Colonoscopy -na  Mammogram -  Asthma test --  PHQ9 -  YAZMIN 7 -    Questioned patient about current smoking habits.  Pt. has never smoked.  Body mass index is 28.65 kg/m .  PULSE regular  My Chart: active  CLASSIFICATION OF OVERWEIGHT AND OBESITY BY BMI                        Obesity Class           BMI(kg/m2)  Underweight                                    < 18.5  Normal                                         18.5-24.9  Overweight                                     25.0-29.9  OBESITY                     I                  30.0-34.9                             II                 35.0-39.9  EXTREME OBESITY             III                >40                            Patient's  BMI Body mass index is 28.65 kg/m .  Http://hin.nhlbi.nih.gov/menuplanner/menu.cgi

## 2019-08-21 LAB — ABBOTT PSA - QUEST: 0.5 NG/ML

## 2019-09-29 ENCOUNTER — HEALTH MAINTENANCE LETTER (OUTPATIENT)
Age: 49
End: 2019-09-29

## 2020-01-24 ENCOUNTER — TRANSFERRED RECORDS (OUTPATIENT)
Dept: FAMILY MEDICINE | Facility: CLINIC | Age: 50
End: 2020-01-24

## 2020-08-15 DIAGNOSIS — E78.2 MIXED HYPERLIPIDEMIA: ICD-10-CM

## 2020-08-17 RX ORDER — SIMVASTATIN 40 MG
TABLET ORAL
Qty: 90 TABLET | Refills: 3 | COMMUNITY
Start: 2020-08-17

## 2020-08-17 NOTE — TELEPHONE ENCOUNTER
Received incoming refill request for  Pending Prescriptions:                       Disp   Refills    simvastatin (ZOCOR) 40 MG tablet [Pharmac*90 tab*3            Sig: TAKE 1 TABLET(40 MG) BY MOUTH AT BEDTIME    Patient last had a refill of this medication on 8/20/19 and this was the last time he was seen. He is now due to be seen and there is no appointment currently scheduled so this is being denied at this time.

## 2020-11-09 ENCOUNTER — MYC REFILL (OUTPATIENT)
Dept: FAMILY MEDICINE | Facility: CLINIC | Age: 50
End: 2020-11-09

## 2020-11-09 DIAGNOSIS — E78.2 MIXED HYPERLIPIDEMIA: ICD-10-CM

## 2020-11-09 RX ORDER — SIMVASTATIN 40 MG
40 TABLET ORAL AT BEDTIME
Qty: 90 TABLET | Refills: 3 | Status: CANCELLED | OUTPATIENT
Start: 2020-11-09

## 2020-11-09 RX ORDER — SIMVASTATIN 40 MG
40 TABLET ORAL AT BEDTIME
Qty: 8 TABLET | Refills: 0 | COMMUNITY
Start: 2020-11-09 | End: 2020-11-16

## 2020-11-09 NOTE — TELEPHONE ENCOUNTER
oYng scheduled an appt for 11/16/2020.  I will call an ext of meds til his appt time.    Signed Prescriptions:                        Disp   Refills    simvastatin (ZOCOR) 40 MG tablet           8 tabl*0        Sig: Take 1 tablet (40 mg) by mouth At Bedtime  Authorizing Provider: HERIBERTO BEST  Ordering User: JACK DUTTA    Pt notified

## 2020-11-16 ENCOUNTER — OFFICE VISIT (OUTPATIENT)
Dept: FAMILY MEDICINE | Facility: CLINIC | Age: 50
End: 2020-11-16

## 2020-11-16 VITALS
DIASTOLIC BLOOD PRESSURE: 90 MMHG | HEIGHT: 66 IN | OXYGEN SATURATION: 100 % | SYSTOLIC BLOOD PRESSURE: 128 MMHG | BODY MASS INDEX: 29.83 KG/M2 | HEART RATE: 72 BPM | TEMPERATURE: 98.3 F | WEIGHT: 185.6 LBS

## 2020-11-16 DIAGNOSIS — Z80.42 FH: PROSTATE CANCER: ICD-10-CM

## 2020-11-16 DIAGNOSIS — E78.2 MIXED HYPERLIPIDEMIA: Primary | ICD-10-CM

## 2020-11-16 LAB
ALBUMIN SERPL-MCNC: 4.7 G/DL (ref 3.6–5.1)
ALBUMIN/GLOB SERPL: 1.7 {RATIO} (ref 1–2.5)
ALP SERPL-CCNC: 73 U/L (ref 33–130)
ALT 1742-6: 20 U/L (ref 0–32)
AST 1920-8: 20 U/L (ref 0–35)
BILIRUB SERPL-MCNC: 0.9 MG/DL (ref 0.2–1.2)
BUN SERPL-MCNC: 7 MG/DL (ref 7–25)
BUN/CREATININE RATIO: 6.5 (ref 6–22)
CALCIUM SERPL-MCNC: 9.1 MG/DL (ref 8.6–10.3)
CHLORIDE SERPLBLD-SCNC: 104.4 MMOL/L (ref 98–110)
CHOLEST SERPL-MCNC: 165 MG/DL (ref 0–199)
CHOLEST/HDLC SERPL: 3 {RATIO} (ref 0–5)
CO2 SERPL-SCNC: 32.1 MMOL/L (ref 20–32)
CREAT SERPL-MCNC: 1.08 MG/DL (ref 0.7–1.18)
GLOBULIN, CALCULATED - QUEST: 2.8 (ref 1.9–3.7)
GLUCOSE SERPL-MCNC: 107 MG/DL (ref 60–99)
HDLC SERPL-MCNC: 48 MG/DL (ref 40–150)
LDLC SERPL CALC-MCNC: 75 MG/DL (ref 0–130)
POTASSIUM SERPL-SCNC: 4.57 MMOL/L (ref 3.5–5.3)
PROT SERPL-MCNC: 7.5 G/DL (ref 6.1–8.1)
SODIUM SERPL-SCNC: 142.1 MMOL/L (ref 135–146)
TRIGL SERPL-MCNC: 209 MG/DL (ref 0–149)

## 2020-11-16 PROCEDURE — 99213 OFFICE O/P EST LOW 20 MIN: CPT | Performed by: FAMILY MEDICINE

## 2020-11-16 PROCEDURE — 80061 LIPID PANEL: CPT | Performed by: FAMILY MEDICINE

## 2020-11-16 PROCEDURE — 80053 COMPREHEN METABOLIC PANEL: CPT | Performed by: FAMILY MEDICINE

## 2020-11-16 PROCEDURE — 36415 COLL VENOUS BLD VENIPUNCTURE: CPT | Performed by: FAMILY MEDICINE

## 2020-11-16 RX ORDER — SIMVASTATIN 40 MG
40 TABLET ORAL AT BEDTIME
Qty: 90 TABLET | Refills: 3 | Status: SHIPPED | OUTPATIENT
Start: 2020-11-16 | End: 2021-12-21

## 2020-11-16 ASSESSMENT — MIFFLIN-ST. JEOR: SCORE: 1646.69

## 2020-11-16 NOTE — PROGRESS NOTES
"Subjective     Yong Bryson is a 50 year old male who presents to clinic today for the following health issues:    HPI         Hyperlipidemia Follow-Up      Are you regularly taking any medication or supplement to lower your cholesterol?   Yes- simvastatin    Are you having muscle aches or other side effects that you think could be caused by your cholesterol lowering medication?  No      How many servings of fruits and vegetables do you eat daily?  2-3    On average, how many sweetened beverages do you drink each day (Examples: soda, juice, sweet tea, etc.  Do NOT count diet or artificially sweetened beverages)?   0    How many days per week do you exercise enough to make your heart beat faster? 3 or less    How many minutes a day do you exercise enough to make your heart beat faster? 20 - 29    How many days per week do you miss taking your medication? 0        Review of Systems   Constitutional, HEENT, cardiovascular, pulmonary, gi and gu systems are negative, except as otherwise noted.      Objective    BP (!) 128/90 (BP Location: Right arm, Patient Position: Sitting, Cuff Size: Adult Large)   Pulse 72   Temp 98.3  F (36.8  C) (Oral)   Ht 1.664 m (5' 5.5\")   Wt 84.2 kg (185 lb 9.6 oz)   SpO2 100%   BMI 30.42 kg/m    Body mass index is 30.42 kg/m .  Physical Exam   GENERAL: healthy, alert and no distress  EYES: Eyes grossly normal to inspection, PERRL and conjunctivae and sclerae normal  HENT: ear canals and TM's normal, nose and mouth without ulcers or lesions  NECK: no adenopathy, no asymmetry, masses, or scars and thyroid normal to palpation  RESP: lungs clear to auscultation - no rales, rhonchi or wheezes  CV: regular rate and rhythm, normal S1 S2, no S3 or S4, no murmur, click or rub, no peripheral edema and peripheral pulses strong  ABDOMEN: soft, nontender, no hepatosplenomegaly, no masses and bowel sounds normal  MS: no gross musculoskeletal defects noted, no edema            Assessment & Plan " "    Mixed hyperlipidemia  Control uncertain, continue current medications at current doses pending labs  - simvastatin (ZOCOR) 40 MG tablet  Dispense: 90 tablet; Refill: 1    FH: prostate cancer  Recheck in a year or 2, baseline already checked in 40's       BMI:   Estimated body mass index is 30.42 kg/m  as calculated from the following:    Height as of this encounter: 1.664 m (5' 5.5\").    Weight as of this encounter: 84.2 kg (185 lb 9.6 oz).   Weight management plan: Discussed healthy diet and exercise guidelines         FUTURE APPOINTMENTS:       - Follow-up visit in 1 yr  Work on weight loss  Regular exercise    No follow-ups on file.    Zane Zamorano MD  [facility]      "

## 2020-11-16 NOTE — NURSING NOTE
Yong randhawa for [facility] med check    Pre-visit Screening:  Immunizations:  up to date  Colonoscopy:  NA  Mammogram: NA  Asthma Action Test/Plan:  NA  PHQ9:  NA  GAD7:  NA  Questioned patient about current smoking habits Pt. has never smoked.  Ok to leave detailed message on voice mail for today's visit only Yes, phone # [phone]

## 2020-12-07 ENCOUNTER — VIRTUAL VISIT (OUTPATIENT)
Dept: INTERNAL MEDICINE | Facility: CLINIC | Age: 50
End: 2020-12-07
Payer: COMMERCIAL

## 2020-12-07 DIAGNOSIS — U07.1 INFECTION DUE TO 2019 NOVEL CORONAVIRUS: Primary | ICD-10-CM

## 2020-12-07 PROCEDURE — 99213 OFFICE O/P EST LOW 20 MIN: CPT | Mod: GT | Performed by: INTERNAL MEDICINE

## 2020-12-07 RX ORDER — MULTIPLE VITAMINS W/ MINERALS TAB 9MG-400MCG
1 TAB ORAL DAILY
COMMUNITY
End: 2024-01-26

## 2020-12-07 RX ORDER — VITAMIN B COMPLEX
TABLET ORAL DAILY
COMMUNITY
End: 2023-11-22

## 2020-12-07 NOTE — PROGRESS NOTES
"Yong Bryson is a 50 year old male who is being evaluated via a billable video visit.      The patient has been notified of following:     \"This video visit will be conducted via a call between you and your physician/provider. We have found that certain health care needs can be provided without the need for an in-person physical exam.  This service lets us provide the care you need with a video conversation.  If a prescription is necessary we can send it directly to your pharmacy.  If lab work is needed we can place an order for that and you can then stop by our lab to have the test done at a later time.    Video visits are billed at different rates depending on your insurance coverage.  Please reach out to your insurance provider with any questions.    If during the course of the call the physician/provider feels a video visit is not appropriate, you will not be charged for this service.\"    Patient has given verbal consent for Video visit? Yes  How would you like to obtain your AVS? Mail a copy  If you are dropped from the video visit, the video invite should be resent to: Text to cell phone: [phone]  Will anyone else be joining your video visit? No    Subjective     Yong Bryson is a 50 year old male who presents today via video visit for the following health issues:    [facility]         Video Start Time: 7:55    He was diagnosed with COVID yesterday. He had a known exposure 9 days ago. He has mild nasal symptoms. He was primarily wondering if the monoclonal antibody treatment is available yet and if so if he would be candidate.           Patient Active Problem List   Diagnosis     Mixed hyperlipidemia     Allergic rhinitis     FHx: celiac disease     Health Care Home     ACP (advance care planning)     FH: prostate cancer     Current Outpatient Medications   Medication Sig Dispense Refill     multivitamin w/minerals (MULTI-VITAMIN) tablet Take 1 tablet by mouth daily       simvastatin (ZOCOR) 40 MG tablet Take 1 " tablet (40 mg) by mouth At Bedtime 90 tablet 3     Vitamin D3 (CHOLECALCIFEROL) 25 mcg (1000 units) tablet Take by mouth daily            Review of Systems   No fever, positive nasal congestion, minor cough      Objective           Vitals:  No vitals were obtained today due to virtual visit.    Physical Exam     GENERAL: Healthy, alert and no distress  EYES: Eyes grossly normal to inspection.  No discharge or erythema, or obvious scleral/conjunctival abnormalities.  RESP: No audible wheeze, cough, or visible cyanosis.  No visible retractions or increased work of breathing.    SKIN: Visible skin clear. No significant rash, abnormal pigmentation or lesions.  NEURO: Cranial nerves grossly intact.  Mentation and speech appropriate for age.  PSYCH: Mentation appears normal, affect normal/bright, judgement and insight intact, normal speech and appearance well-groomed.            Assessment & Plan     Infection due to 2019 novel coronavirus  Advised that the treatment with monoclonal antibodies is not yet available, advised that this treatment is usually for more ill patients but if he has worsening symptoms he could call back to his primary clinic for more information as the treatment may soon become available.     Advised on otc cough med, analgesics for fever and body aches.             Return in about 6 months (around 6/7/2021) for Physical Exam with Dr. Zamorano.    Tata Olivia MD  [facility]      Video-Visit Details    Type of service:  Video Visit    Video End Time:7:59    Originating Location (pt. Location): Home    Distant Location (provider location): home    Platform used for Video Visit: [place]

## 2021-01-14 ENCOUNTER — HEALTH MAINTENANCE LETTER (OUTPATIENT)
Age: 51
End: 2021-01-14

## 2021-05-07 ENCOUNTER — TRANSFERRED RECORDS (OUTPATIENT)
Dept: FAMILY MEDICINE | Facility: CLINIC | Age: 51
End: 2021-05-07

## 2021-10-23 ENCOUNTER — HEALTH MAINTENANCE LETTER (OUTPATIENT)
Age: 51
End: 2021-10-23

## 2021-10-27 DIAGNOSIS — E78.2 MIXED HYPERLIPIDEMIA: ICD-10-CM

## 2021-10-28 RX ORDER — SIMVASTATIN 40 MG
TABLET ORAL
Qty: 90 TABLET | Refills: 3 | COMMUNITY
Start: 2021-10-28

## 2021-10-28 NOTE — TELEPHONE ENCOUNTER
Received incoming refill request for  Pending Prescriptions:                       Disp   Refills    simvastatin (ZOCOR) 40 MG tablet [Pharmac*90 tab*3            Sig: TAKE 1 TABLET(40 MG) BY MOUTH AT BEDTIME    Patient last had a refill of this medication on 11/16/2020 for a one year supply. The patient is now due or their annual med check and there is nothing currently scheduled so this is being denied.

## 2021-12-21 ENCOUNTER — OFFICE VISIT (OUTPATIENT)
Dept: FAMILY MEDICINE | Facility: CLINIC | Age: 51
End: 2021-12-21

## 2021-12-21 VITALS
SYSTOLIC BLOOD PRESSURE: 138 MMHG | TEMPERATURE: 97 F | HEIGHT: 66 IN | WEIGHT: 180.4 LBS | RESPIRATION RATE: 20 BRPM | BODY MASS INDEX: 28.99 KG/M2 | HEART RATE: 64 BPM | DIASTOLIC BLOOD PRESSURE: 84 MMHG

## 2021-12-21 DIAGNOSIS — Z80.42 FH: PROSTATE CANCER: ICD-10-CM

## 2021-12-21 DIAGNOSIS — Z00.00 ROUTINE GENERAL MEDICAL EXAMINATION AT A HEALTH CARE FACILITY: Primary | ICD-10-CM

## 2021-12-21 DIAGNOSIS — G47.00 INSOMNIA, UNSPECIFIED TYPE: ICD-10-CM

## 2021-12-21 DIAGNOSIS — E78.2 MIXED HYPERLIPIDEMIA: ICD-10-CM

## 2021-12-21 LAB
ALBUMIN SERPL-MCNC: 4.6 G/DL (ref 3.6–5.1)
ALBUMIN/GLOB SERPL: 1.7 {RATIO} (ref 1–2.5)
ALP SERPL-CCNC: 77 U/L (ref 33–130)
ALT 1742-6: 18 U/L (ref 0–32)
AST 1920-8: 15 U/L (ref 0–35)
BILIRUB SERPL-MCNC: 0.8 MG/DL (ref 0.2–1.2)
BUN SERPL-MCNC: 10 MG/DL (ref 7–25)
BUN/CREATININE RATIO: 9.6 (ref 6–22)
CALCIUM SERPL-MCNC: 9.7 MG/DL (ref 8.6–10.3)
CHLORIDE SERPLBLD-SCNC: 104 MMOL/L (ref 98–110)
CHOLEST SERPL-MCNC: 171 MG/DL (ref 0–199)
CHOLEST/HDLC SERPL: 3 {RATIO} (ref 0–5)
CO2 SERPL-SCNC: 32 MMOL/L (ref 20–32)
CREAT SERPL-MCNC: 1.04 MG/DL (ref 0.6–1.3)
GLOBULIN, CALCULATED - QUEST: 2.7 (ref 1.9–3.7)
GLUCOSE SERPL-MCNC: 102 MG/DL (ref 60–99)
HDLC SERPL-MCNC: 49 MG/DL (ref 40–150)
LDLC SERPL CALC-MCNC: 90 MG/DL (ref 0–130)
POTASSIUM SERPL-SCNC: 4.01 MMOL/L (ref 3.5–5.3)
PROT SERPL-MCNC: 7.3 G/DL (ref 6.1–8.1)
SODIUM SERPL-SCNC: 142.6 MMOL/L (ref 135–146)
TRIGL SERPL-MCNC: 162 MG/DL (ref 0–149)

## 2021-12-21 PROCEDURE — 36415 COLL VENOUS BLD VENIPUNCTURE: CPT | Performed by: FAMILY MEDICINE

## 2021-12-21 PROCEDURE — 80053 COMPREHEN METABOLIC PANEL: CPT | Performed by: FAMILY MEDICINE

## 2021-12-21 PROCEDURE — 99396 PREV VISIT EST AGE 40-64: CPT | Mod: 25 | Performed by: FAMILY MEDICINE

## 2021-12-21 PROCEDURE — 90471 IMMUNIZATION ADMIN: CPT | Performed by: FAMILY MEDICINE

## 2021-12-21 PROCEDURE — 90714 TD VACC NO PRESV 7 YRS+ IM: CPT | Performed by: FAMILY MEDICINE

## 2021-12-21 PROCEDURE — 80061 LIPID PANEL: CPT | Performed by: FAMILY MEDICINE

## 2021-12-21 RX ORDER — TRAZODONE HYDROCHLORIDE 50 MG/1
50 TABLET, FILM COATED ORAL AT BEDTIME
Qty: 30 TABLET | Refills: 1 | Status: SHIPPED | OUTPATIENT
Start: 2021-12-21 | End: 2022-02-28

## 2021-12-21 RX ORDER — SIMVASTATIN 40 MG
40 TABLET ORAL AT BEDTIME
Qty: 90 TABLET | Refills: 3 | Status: SHIPPED | OUTPATIENT
Start: 2021-12-21 | End: 2022-10-24

## 2021-12-21 ASSESSMENT — MIFFLIN-ST. JEOR: SCORE: 1618.1

## 2021-12-21 NOTE — PROGRESS NOTES
3  SUBJECTIVE:   CC: Yong Bryson is an 51 year old male who presents for preventive health visit.       Patient has been advised of split billing requirements and indicates understanding: Yes  Healthy Habits:    Do you get at least three servings of calcium containing foods daily (dairy, green leafy vegetables, etc.)? yes    Amount of exercise or daily activities, outside of work: a few day(s) per week    Problems taking medications regularly No    Medication side effects: No    Have you had an eye exam in the past two years? yes    Do you see a dentist twice per year? yes    Do you have sleep apnea, excessive snoring or daytime drowsiness?no      Pt states he does sturggle to sleep, benadryl and melatonin don'y always help    Today's PHQ-2 Score:   PHQ-2 ( 1999 Pfizer) 12/21/2021 11/16/2020   Q1: Little interest or pleasure in doing things 0 0   Q2: Feeling down, depressed or hopeless 0 0   PHQ-2 Score 0 0   PHQ-2 Total Score (12-17 Years)- Positive if 3 or more points; Administer PHQ-A if positive - 0       Abuse: Current or Past(Physical, Sexual or Emotional)- No  Do you feel safe in your environment? Yes        Social History     Tobacco Use     Smoking status: Never Smoker     Smokeless tobacco: Never Used   Substance Use Topics     Alcohol use: Yes     Alcohol/week: 14.0 standard drinks     Types: 14 Standard drinks or equivalent per week     Comment: 2 glases wine per day     If you drink alcohol do you typically have >3 drinks per day or >7 drinks per week? No                      Last PSA:   [facility] PSA   Date Value Ref Range Status   08/20/2019 0.5 < OR = 4.0 ng/mL Final     Comment:     The total PSA value from this assay system is   standardized against the WHO standard. The test   result will be approximately 20% lower when compared   to the equimolar-standardized total PSA (Cezar   Bonnie). Comparison of serial PSA results should be   interpreted with this fact in mind.     This test was performed  using the Siemens   chemiluminescent method. Values obtained from   different assay methods cannot be used  interchangeably. PSA levels, regardless of  value, should not be interpreted as absolute  evidence of the presence or absence of disease.         Reviewed orders with patient. Reviewed health maintenance and updated orders accordingly - Yes  BP Readings from Last 3 Encounters:   12/21/21 138/84   11/16/20 (!) 128/90   08/20/19 138/74    Wt Readings from Last 3 Encounters:   12/21/21 81.8 kg (180 lb 6.4 oz)   11/16/20 84.2 kg (185 lb 9.6 oz)   08/20/19 79.3 kg (174 lb 12.8 oz)                  Patient Active Problem List   Diagnosis     Mixed hyperlipidemia     Allergic rhinitis     FHx: celiac disease     Health Care Home     ACP (advance care planning)     FH: prostate cancer     Past Surgical History:   Procedure Laterality Date     PHOTOREFRACTIVE KERATECTOMY  2008    bilateral       Social History     Tobacco Use     Smoking status: Never Smoker     Smokeless tobacco: Never Used   Substance Use Topics     Alcohol use: Yes     Alcohol/week: 14.0 standard drinks     Types: 14 Standard drinks or equivalent per week     Comment: 2 glases wine per day     Family History   Problem Relation Age of Onset     Genetic Disorder Mother         Addisons     Cerebrovascular Disease Mother      Coronary Artery Disease Mother      Hypertension Father      Lipids Father      Prostate Cancer Father      Diabetes Maternal Grandfather      Gastrointestinal Disease Daughter         celiac     C.A.D. No family hx of          Current Outpatient Medications   Medication Sig Dispense Refill     multivitamin w/minerals (MULTI-VITAMIN) tablet Take 1 tablet by mouth daily       simvastatin (ZOCOR) 40 MG tablet Take 1 tablet (40 mg) by mouth At Bedtime 90 tablet 3     Vitamin D3 (CHOLECALCIFEROL) 25 mcg (1000 units) tablet Take by mouth daily       Allergies   Allergen Reactions     Sulfa Drugs      Recent Labs   Lab Test  "11/16/20  0000 08/20/19  0000 07/26/18  1133 04/15/17  1012 03/12/16  0909   LDL 75 91 97 92 73   HDL 48 47 48 40 34*   TRIG 209* 121 95 73 71   ALT  --   --  31 15 15   CR 1.08 1.13 1.03 1.16 1.03   GFRESTIMATED  --   --  87 76 89   POTASSIUM 4.57 3.87 4.7 4.0 3.9        Reviewed and updated as needed this visit by clinical staff  Tobacco  Allergies  Meds  Problems  Med Hx  Surg Hx          Reviewed and updated as needed this visit by Provider               Past Medical History:   Diagnosis Date     Mixed hyperlipidemia       Past Surgical History:   Procedure Laterality Date     PHOTOREFRACTIVE KERATECTOMY  2008    bilateral       ROS:  CONSTITUTIONAL: NEGATIVE for fever, chills, change in weight  INTEGUMENTARY/SKIN: NEGATIVE for worrisome rashes, moles or lesions  EYES: NEGATIVE for vision changes or irritation  ENT: NEGATIVE for ear, mouth and throat problems  RESP: NEGATIVE for significant cough or SOB  CV: NEGATIVE for chest pain, palpitations or peripheral edema  GI: NEGATIVE for nausea, abdominal pain, heartburn, or change in bowel habits   male: negative for dysuria, hematuria, decreased urinary stream, erectile dysfunction, urethral discharge  MUSCULOSKELETAL: NEGATIVE for significant arthralgias or myalgia  NEURO: NEGATIVE for weakness, dizziness or paresthesias  ENDOCRINE: NEGATIVE for temperature intolerance, skin/hair changes  PSYCHIATRIC: NEGATIVE for changes in mood or affect    OBJECTIVE:   /84 (BP Location: Right arm, Patient Position: Chair, Cuff Size: Adult Regular)   Pulse 64   Temp 97  F (36.1  C)   Resp 20   Ht 1.664 m (5' 5.5\")   Wt 81.8 kg (180 lb 6.4 oz)   BMI 29.56 kg/m    EXAM:  GENERAL: healthy, alert and no distress  EYES: Eyes grossly normal to inspection, PERRL and conjunctivae and sclerae normal  HENT: ear canals and TM's normal, nose and mouth without ulcers or lesions  NECK: no adenopathy, no asymmetry, masses, or scars and thyroid normal to palpation  RESP: " lungs clear to auscultation - no rales, rhonchi or wheezes  CV: regular rate and rhythm, normal S1 S2, no S3 or S4, no murmur, click or rub, no peripheral edema and peripheral pulses strong  ABDOMEN: soft, nontender, no hepatosplenomegaly, no masses and bowel sounds normal   (male): normal male genitalia without lesions or urethral discharge, no hernia  RECTAL (male): deferred  MS: no gross musculoskeletal defects noted, no edema  SKIN: no suspicious lesions or rashes  NEURO: Normal strength and tone, mentation intact and speech normal  PSYCH: mentation appears normal, affect normal/bright    Diagnostic Test Results:  Labs reviewed in Epic    ASSESSMENT/PLAN:   (Z00.00) Routine general medical examination at a health care facility  (primary encounter diagnosis)  Comment: discussed preventitive healthcare   Plan: Continue to work on healthy diet and exercise, discussed healthy habits     (E78.2) Mixed hyperlipidemia  Comment: control uncertain, continue current medications at current doses pendimg ;abs  Plan: simvastatin (ZOCOR) 40 MG tablet, VENOUS         COLLECTION, Lipid Panel ([facility]), Comprehensive         Metobolic Panel ([facility])            (Z80.42) FH: prostate cancer  Comment:   Plan: PSA Total ([facility])            (G47.00) Insomnia, unspecified type  Comment: discussed option to try trazodone as I would prefer he not use benadryl daily, also will not presxribe ambien  Plan: traZODone (DESYREL) 50 MG tablet        I reviewed the risks, benefits, and possible side effects of the medication.  The patient had an opportunity to ask any questions regarding the treatment plan. The patient was encouraged to call my office if any problems.       Patient has been advised of split billing requirements and indicates understanding: Yes  COUNSELING:  Reviewed preventive health counseling, as reflected in patient instructions       Regular exercise       Healthy diet/nutrition       Vision screening       Hearing screening       " Immunizations    Vaccinated for: Td             Colon cancer screening       Prostate cancer screening    Estimated body mass index is 29.56 kg/m  as calculated from the following:    Height as of this encounter: 1.664 m (5' 5.5\").    Weight as of this encounter: 81.8 kg (180 lb 6.4 oz).    Weight management plan: Discussed healthy diet and exercise guidelines    He reports that he has never smoked. He has never used smokeless tobacco.      Counseling Resources:  ATP IV Guidelines  Pooled Cohorts Equation Calculator  FRAX Risk Assessment  ICSI Preventive Guidelines  Dietary Guidelines for Americans, 2010  USDA's MyPlate  ASA Prophylaxis  Lung CA Screening    Zane Zamorano MD  [facility]  "

## 2021-12-21 NOTE — NURSING NOTE
Yong Bryson is here for a CPX.    Pre-visit planning  Immunizations -up to date  Colonoscopy -  Mammogram -  Asthma test --  PHQ9 -  YAZMIN 7 -  Hearing screen -    Questioned patient about current smoking habits.  Pt. has never smoked.  Body mass index is 29.56 kg/m .  PULSE regular  My Chart: active  CLASSIFICATION OF OVERWEIGHT AND OBESITY BY BMI                        Obesity Class           BMI(kg/m2)  Underweight                                    < 18.5  Normal                                         18.5-24.9  Overweight                                     25.0-29.9  OBESITY                     I                  30.0-34.9                             II                 35.0-39.9  EXTREME OBESITY             III                >40                            Patient's  BMI Body mass index is 29.56 kg/m .

## 2021-12-22 LAB — ABBOTT PSA - QUEST: 0.59 NG/ML

## 2022-02-25 ENCOUNTER — MYC MEDICAL ADVICE (OUTPATIENT)
Dept: FAMILY MEDICINE | Facility: CLINIC | Age: 52
End: 2022-02-25

## 2022-02-25 DIAGNOSIS — G47.00 INSOMNIA, UNSPECIFIED TYPE: ICD-10-CM

## 2022-02-25 RX ORDER — TRAZODONE HYDROCHLORIDE 50 MG/1
TABLET, FILM COATED ORAL
Qty: 30 TABLET | Refills: 1 | COMMUNITY
Start: 2022-02-25

## 2022-02-25 NOTE — TELEPHONE ENCOUNTER
Yong Bryson is requesting a refill of:    Refused Prescriptions:                       Disp   Refills    traZODone (DESYREL) 50 MG tablet [Pharmacy*30 tab*1        Sig: TAKE 1 TABLET(50 MG) BY MOUTH AT BEDTIME  Refused By: VALENTINA WATT  Reason for Refusal: Patient should contact provider first    Small supply sent at visit on 12//21/21, Sent [facility]

## 2022-02-28 RX ORDER — TRAZODONE HYDROCHLORIDE 50 MG/1
50 TABLET, FILM COATED ORAL AT BEDTIME
Qty: 90 TABLET | Refills: 0 | Status: SHIPPED | OUTPATIENT
Start: 2022-02-28 | End: 2022-05-30

## 2022-02-28 NOTE — TELEPHONE ENCOUNTER
Please advise. Trazodone was started on 12/21/21 pt said this is working well for him.    Yong Bryson is requesting a refill of:    Pending Prescriptions:                       Disp   Refills    traZODone (DESYREL) 50 MG tablet                              Sig: Take 1 tablet (50 mg) by mouth At Bedtime

## 2022-05-30 DIAGNOSIS — G47.00 INSOMNIA, UNSPECIFIED TYPE: ICD-10-CM

## 2022-05-31 RX ORDER — TRAZODONE HYDROCHLORIDE 50 MG/1
TABLET, FILM COATED ORAL
Qty: 90 TABLET | Refills: 1 | Status: SHIPPED | OUTPATIENT
Start: 2022-05-31 | End: 2023-11-22

## 2022-05-31 NOTE — TELEPHONE ENCOUNTER
Received incoming refill request for  Pending Prescriptions:                       Disp   Refills    traZODone (DESYREL) 50 MG tablet [Pharmac*90 tab*0            Sig: TAKE 1 TABLET(50 MG) BY MOUTH AT BEDTIME    Patient last had a refill of this medication on 2/28/22 for a 90 day supply and patient was last seen in December for his annual physical where labs were done. Routing to Dr. Zamorano for approval of another 6 month supply to get patient until December when he is due again for his appt. Unless he is due back sooner per Dr. Zamorano.

## 2022-10-10 ENCOUNTER — HEALTH MAINTENANCE LETTER (OUTPATIENT)
Age: 52
End: 2022-10-10

## 2022-10-24 DIAGNOSIS — E78.2 MIXED HYPERLIPIDEMIA: ICD-10-CM

## 2022-10-24 NOTE — TELEPHONE ENCOUNTER
Yong Bryson is requesting a refill of:    Pending Prescriptions:                       Disp   Refills    simvastatin (ZOCOR) 40 MG tablet [Pharmac*90 tab*0            Sig: TAKE 1 TABLET(40 MG) BY MOUTH AT BEDTIME    Recent Labs   Lab Test 12/21/21  0000 11/16/20  0000   CHOL 171 165   HDL 49 48   LDL 90 75   TRIG 162* 209*   CHOLHDLRATIO 3 3     Needs OV for refills

## 2022-10-25 RX ORDER — SIMVASTATIN 40 MG
TABLET ORAL
Qty: 90 TABLET | Refills: 0 | Status: SHIPPED | OUTPATIENT
Start: 2022-10-25 | End: 2023-02-02

## 2023-02-02 DIAGNOSIS — E78.2 MIXED HYPERLIPIDEMIA: ICD-10-CM

## 2023-02-02 RX ORDER — SIMVASTATIN 40 MG
40 TABLET ORAL AT BEDTIME
Qty: 20 TABLET | Refills: 0 | Status: SHIPPED | OUTPATIENT
Start: 2023-02-02 | End: 2023-02-20

## 2023-02-02 NOTE — TELEPHONE ENCOUNTER
Pt scheduled appt for 02/20/23. Needs extension of his simvastatin to get him to this scheduled appt    Yong Bryson is requesting a refill of:    Pending Prescriptions:                       Disp   Refills    simvastatin (ZOCOR) 40 MG tablet          20 tab*0            Sig: Take 1 tablet (40 mg) by mouth At Bedtime

## 2023-02-20 ENCOUNTER — OFFICE VISIT (OUTPATIENT)
Dept: FAMILY MEDICINE | Facility: CLINIC | Age: 53
End: 2023-02-20

## 2023-02-20 VITALS
SYSTOLIC BLOOD PRESSURE: 142 MMHG | DIASTOLIC BLOOD PRESSURE: 74 MMHG | HEART RATE: 68 BPM | HEIGHT: 66 IN | RESPIRATION RATE: 20 BRPM | WEIGHT: 172.8 LBS | BODY MASS INDEX: 27.77 KG/M2 | TEMPERATURE: 97.1 F

## 2023-02-20 DIAGNOSIS — Z80.42 FH: PROSTATE CANCER: ICD-10-CM

## 2023-02-20 DIAGNOSIS — Z12.5 SPECIAL SCREENING FOR MALIGNANT NEOPLASM OF PROSTATE: ICD-10-CM

## 2023-02-20 DIAGNOSIS — Z12.11 SPECIAL SCREENING FOR MALIGNANT NEOPLASMS, COLON: ICD-10-CM

## 2023-02-20 DIAGNOSIS — E78.2 MIXED HYPERLIPIDEMIA: ICD-10-CM

## 2023-02-20 DIAGNOSIS — Z00.00 ROUTINE GENERAL MEDICAL EXAMINATION AT A HEALTH CARE FACILITY: Primary | ICD-10-CM

## 2023-02-20 DIAGNOSIS — G47.00 INSOMNIA, UNSPECIFIED TYPE: ICD-10-CM

## 2023-02-20 LAB
ALBUMIN SERPL-MCNC: 4.6 G/DL (ref 3.6–5.1)
ALBUMIN/GLOB SERPL: 1.8 {RATIO} (ref 1–2.5)
ALP SERPL-CCNC: 64 U/L (ref 33–130)
ALT 1742-6: 15 U/L (ref 0–32)
AST 1920-8: 17 U/L (ref 0–35)
BILIRUB SERPL-MCNC: 0.9 MG/DL (ref 0.2–1.2)
BUN SERPL-MCNC: 14 MG/DL (ref 7–25)
BUN/CREATININE RATIO: 13.7 (ref 6–22)
CALCIUM SERPL-MCNC: 9.5 MG/DL (ref 8.6–10.3)
CHLORIDE SERPLBLD-SCNC: 100.2 MMOL/L (ref 98–110)
CHOLEST SERPL-MCNC: 137 MG/DL (ref 0–199)
CHOLEST/HDLC SERPL: 3 {RATIO} (ref 0–5)
CO2 SERPL-SCNC: 30.3 MMOL/L (ref 20–32)
CREAT SERPL-MCNC: 1.02 MG/DL (ref 0.6–1.3)
GLOBULIN, CALCULATED - QUEST: 2.6 (ref 1.9–3.7)
GLUCOSE SERPL-MCNC: 90 MG/DL (ref 60–99)
HDLC SERPL-MCNC: 46 MG/DL (ref 40–150)
LDLC SERPL CALC-MCNC: 79 MG/DL (ref 0–130)
POTASSIUM SERPL-SCNC: 4.27 MMOL/L (ref 3.5–5.3)
PROT SERPL-MCNC: 7.2 G/DL (ref 6.1–8.1)
SODIUM SERPL-SCNC: 139.8 MMOL/L (ref 135–146)
TRIGL SERPL-MCNC: 59 MG/DL (ref 0–149)

## 2023-02-20 PROCEDURE — 99396 PREV VISIT EST AGE 40-64: CPT | Performed by: FAMILY MEDICINE

## 2023-02-20 PROCEDURE — 80053 COMPREHEN METABOLIC PANEL: CPT | Performed by: FAMILY MEDICINE

## 2023-02-20 PROCEDURE — 36415 COLL VENOUS BLD VENIPUNCTURE: CPT | Performed by: FAMILY MEDICINE

## 2023-02-20 PROCEDURE — 80061 LIPID PANEL: CPT | Performed by: FAMILY MEDICINE

## 2023-02-20 RX ORDER — SIMVASTATIN 40 MG
40 TABLET ORAL AT BEDTIME
Qty: 20 TABLET | Refills: 0 | Status: SHIPPED | OUTPATIENT
Start: 2023-02-20 | End: 2023-03-22

## 2023-02-20 NOTE — PROGRESS NOTES
3  SUBJECTIVE:   CC: Yong Bryson is an 52 year old male who presents for preventive health visit.       Patient has been advised of split billing requirements and indicates understanding: Yes  Healthy Habits:    Do you get at least three servings of calcium containing foods daily (dairy, green leafy vegetables, etc.)? yes    Amount of exercise or daily activities, outside of work: 5 day(s) per week    Problems taking medications regularly No    Medication side effects: No    Have you had an eye exam in the past two years? yes    Do you see a dentist twice per year? yes    Do you have sleep apnea, excessive snoring or daytime drowsiness?no          Today's PHQ-2 Score:   PHQ-2 ( 1999 Pfizer) 2/20/2023 12/21/2021   Q1: Little interest or pleasure in doing things 0 0   Q2: Feeling down, depressed or hopeless 0 0   PHQ-2 Score 0 0   PHQ-2 Total Score (12-17 Years)- Positive if 3 or more points; Administer PHQ-A if positive - -       Abuse: Current or Past(Physical, Sexual or Emotional)- No  Do you feel safe in your environment? Yes        Social History     Tobacco Use     Smoking status: Never     Smokeless tobacco: Never   Substance Use Topics     Alcohol use: Yes     Alcohol/week: 14.0 standard drinks     Types: 14 Standard drinks or equivalent per week     Comment: 2 glases wine per day     If you drink alcohol do you typically have >3 drinks per day or >7 drinks per week? No                      Last PSA:   Abbott PSA   Date Value Ref Range Status   12/21/2021 0.59 < OR = 4.00 ng/mL Final     Comment:     The total PSA value from this assay system is   standardized against the WHO standard. The test   result will be approximately 20% lower when compared   to the equimolar-standardized total PSA (Cezar   Bonnie). Comparison of serial PSA results should be   interpreted with this fact in mind.     This test was performed using the Siemens   chemiluminescent method. Values obtained from   different assay methods  cannot be used  interchangeably. PSA levels, regardless of  value, should not be interpreted as absolute  evidence of the presence or absence of disease.         Reviewed orders with patient. Reviewed health maintenance and updated orders accordingly - Yes  BP Readings from Last 3 Encounters:   02/20/23 (!) 142/74   12/21/21 138/84   11/16/20 (!) 128/90    Wt Readings from Last 3 Encounters:   02/20/23 78.4 kg (172 lb 12.8 oz)   12/21/21 81.8 kg (180 lb 6.4 oz)   11/16/20 84.2 kg (185 lb 9.6 oz)                  Patient Active Problem List   Diagnosis     Mixed hyperlipidemia     Allergic rhinitis     FHx: celiac disease     Health Care Home     ACP (advance care planning)     FH: prostate cancer     Past Surgical History:   Procedure Laterality Date     PHOTOREFRACTIVE KERATECTOMY  2008    bilateral       Social History     Tobacco Use     Smoking status: Never     Smokeless tobacco: Never   Substance Use Topics     Alcohol use: Yes     Alcohol/week: 14.0 standard drinks     Types: 14 Standard drinks or equivalent per week     Comment: 2 glases wine per day     Family History   Problem Relation Age of Onset     Genetic Disorder Mother         Addisons     Cerebrovascular Disease Mother      Coronary Artery Disease Mother      Hypertension Father      Lipids Father      Prostate Cancer Father      Diabetes Maternal Grandfather      Gastrointestinal Disease Daughter         celiac     C.A.D. No family hx of          Current Outpatient Medications   Medication Sig Dispense Refill     multivitamin w/minerals (MULTI-VITAMIN) tablet Take 1 tablet by mouth daily       simvastatin (ZOCOR) 40 MG tablet Take 1 tablet (40 mg) by mouth At Bedtime 20 tablet 0     traZODone (DESYREL) 50 MG tablet TAKE 1 TABLET(50 MG) BY MOUTH AT BEDTIME 90 tablet 1     Vitamin D3 (CHOLECALCIFEROL) 25 mcg (1000 units) tablet Take by mouth daily       Allergies   Allergen Reactions     Sulfa Drugs      Recent Labs   Lab Test 12/21/21  0000  "11/16/20  0000 08/20/19  0000 07/26/18  1133 04/15/17  1012 03/12/16  0909   LDL 90 75 91 97 92 73   HDL 49 48 47 48 40 34*   TRIG 162* 209* 121 95 73 71   ALT  --   --   --  31 15 15   CR 1.04 1.08 1.13 1.03 1.16 1.03   GFRESTIMATED  --   --   --  87 76 89   POTASSIUM 4.01 4.57 3.87 4.7 4.0 3.9        Reviewed and updated as needed this visit by clinical staff   Tobacco  Allergies  Meds  Problems  Med Hx  Surg Hx           Reviewed and updated as needed this visit by Provider                 Past Medical History:   Diagnosis Date     Mixed hyperlipidemia       Past Surgical History:   Procedure Laterality Date     PHOTOREFRACTIVE KERATECTOMY  2008    bilateral       ROS:  CONSTITUTIONAL: NEGATIVE for fever, chills, change in weight  INTEGUMENTARY/SKIN: NEGATIVE for worrisome rashes, moles or lesions  EYES: NEGATIVE for vision changes or irritation  ENT: NEGATIVE for ear, mouth and throat problems  RESP: NEGATIVE for significant cough or SOB  CV: NEGATIVE for chest pain, palpitations or peripheral edema  GI: NEGATIVE for nausea, abdominal pain, heartburn, or change in bowel habits   male: negative for dysuria, hematuria, decreased urinary stream, erectile dysfunction, urethral discharge  MUSCULOSKELETAL: NEGATIVE for significant arthralgias or myalgia  NEURO: NEGATIVE for weakness, dizziness or paresthesias  ENDOCRINE: NEGATIVE for temperature intolerance, skin/hair changes  PSYCHIATRIC: NEGATIVE for changes in mood or affect    OBJECTIVE:   BP (!) 142/74 (BP Location: Right arm, Patient Position: Chair, Cuff Size: Adult Regular)   Pulse 68   Temp 97.1  F (36.2  C) (Temporal)   Resp 20   Ht 1.664 m (5' 5.5\")   Wt 78.4 kg (172 lb 12.8 oz)   BMI 28.32 kg/m    EXAM:  GENERAL: healthy, alert and no distress  EYES: Eyes grossly normal to inspection, PERRL and conjunctivae and sclerae normal  HENT: ear canals and TM's normal, nose and mouth without ulcers or lesions  NECK: no adenopathy, no asymmetry, " "masses, or scars and thyroid normal to palpation  RESP: lungs clear to auscultation - no rales, rhonchi or wheezes  CV: regular rate and rhythm, normal S1 S2, no S3 or S4, no murmur, click or rub, no peripheral edema and peripheral pulses strong  ABDOMEN: soft, nontender, no hepatosplenomegaly, no masses and bowel sounds normal   (male): normal male genitalia without lesions or urethral discharge, no hernia  MS: no gross musculoskeletal defects noted, no edema  SKIN: no suspicious lesions or rashes  NEURO: Normal strength and tone, mentation intact and speech normal  PSYCH: mentation appears normal, affect normal/bright    Diagnostic Test Results:  Labs reviewed in Epic    ASSESSMENT/PLAN:   (Z00.00) Routine general medical examination at a health care facility  (primary encounter diagnosis)  Comment: discussed preventitive healthcare   Plan: Continue to work on healthy diet and exercise, discussed healthy habits     (E78.2) Mixed hyperlipidemia  Comment: control uncertain  Plan: continue current medications at current doses pending labs    (G47.00) Insomnia, unspecified type  Comment: off trazodone, sleeping well  Plan:     (Z80.42) FH: prostate cancer  Comment:   Plan:     (Z12.5) Special screening for malignant neoplasm of prostate  Comment:   Plan:     (Z12.11) Special screening for malignant neoplasms, colon  Comment:   Plan:     Patient has been advised of split billing requirements and indicates understanding: Yes  COUNSELING:  Reviewed preventive health counseling, as reflected in patient instructions       Regular exercise       Healthy diet/nutrition       Vision screening       Immunizations    Recommend shingrix             Colorectal cancer screening       Prostate cancer screening    Estimated body mass index is 28.32 kg/m  as calculated from the following:    Height as of this encounter: 1.664 m (5' 5.5\").    Weight as of this encounter: 78.4 kg (172 lb 12.8 oz).        He reports that he has never " smoked. He has never used smokeless tobacco.      Counseling Resources:  ATP IV Guidelines  Pooled Cohorts Equation Calculator  FRAX Risk Assessment  ICSI Preventive Guidelines  Dietary Guidelines for Americans, 2010  USDA's MyPlate  ASA Prophylaxis  Lung CA Screening    Zane Zamornao MD  [facility]

## 2023-02-20 NOTE — NURSING NOTE
Yong Bryson is here for a CPX.    Pre-visit planning  Immunizations -up to date  Colonoscopy -is due and ordered today  Mammogram -  Asthma test --  PHQ9 -  YAZMIN 7 -      Questioned patient about current smoking habits.  Pt. has never smoked.  Body mass index is 28.32 kg/m .  PULSE regular  My Chart: active  CLASSIFICATION OF OVERWEIGHT AND OBESITY BY BMI                        Obesity Class           BMI(kg/m2)  Underweight                                    < 18.5  Normal                                         18.5-24.9  Overweight                                     25.0-29.9  OBESITY                     I                  30.0-34.9                             II                 35.0-39.9  EXTREME OBESITY             III                >40                            Patient's  BMI Body mass index is 28.32 kg/m .

## 2023-02-21 LAB — ABBOTT PSA - QUEST: 0.54 NG/ML

## 2023-03-22 DIAGNOSIS — E78.2 MIXED HYPERLIPIDEMIA: ICD-10-CM

## 2023-03-22 RX ORDER — SIMVASTATIN 40 MG
40 TABLET ORAL AT BEDTIME
Qty: 90 TABLET | Refills: 1 | Status: SHIPPED | OUTPATIENT
Start: 2023-03-22 | End: 2023-09-20

## 2023-03-22 NOTE — TELEPHONE ENCOUNTER
Patients wife called into the Cs line stating that when he was in for his physical in Feb, a 20 tablet supply of his   Pending Prescriptions:                       Disp   Refills    simvastatin (ZOCOR) 40 MG tablet          90 tab*1            Sig: Take 1 tablet (40 mg) by mouth At Bedtime    She is asking that the full refill be sent in. Routing to Dr. Zamorano for review and to send in.

## 2023-04-06 ENCOUNTER — TRANSFERRED RECORDS (OUTPATIENT)
Dept: FAMILY MEDICINE | Facility: CLINIC | Age: 53
End: 2023-04-06

## 2023-04-21 ENCOUNTER — OFFICE VISIT (OUTPATIENT)
Dept: FAMILY MEDICINE | Facility: CLINIC | Age: 53
End: 2023-04-21

## 2023-04-21 VITALS
RESPIRATION RATE: 20 BRPM | SYSTOLIC BLOOD PRESSURE: 150 MMHG | DIASTOLIC BLOOD PRESSURE: 94 MMHG | WEIGHT: 173 LBS | BODY MASS INDEX: 27.8 KG/M2 | TEMPERATURE: 97.1 F | HEART RATE: 72 BPM | HEIGHT: 66 IN

## 2023-04-21 DIAGNOSIS — I10 BENIGN ESSENTIAL HYPERTENSION: Primary | ICD-10-CM

## 2023-04-21 LAB
BUN SERPL-MCNC: 14 MG/DL (ref 7–25)
BUN/CREATININE RATIO: 13.5 (ref 6–22)
CALCIUM SERPL-MCNC: 9.4 MG/DL (ref 8.6–10.3)
CHLORIDE SERPLBLD-SCNC: 97.6 MMOL/L (ref 98–110)
CO2 SERPL-SCNC: 33 MMOL/L (ref 20–32)
CREAT SERPL-MCNC: 1.04 MG/DL (ref 0.6–1.3)
GLUCOSE SERPL-MCNC: 100 MG/DL (ref 60–99)
POTASSIUM SERPL-SCNC: 4.5 MMOL/L (ref 3.5–5.3)
SODIUM SERPL-SCNC: 137.7 MMOL/L (ref 135–146)

## 2023-04-21 PROCEDURE — 80048 BASIC METABOLIC PNL TOTAL CA: CPT | Performed by: PHYSICIAN ASSISTANT

## 2023-04-21 PROCEDURE — 99214 OFFICE O/P EST MOD 30 MIN: CPT | Performed by: PHYSICIAN ASSISTANT

## 2023-04-21 PROCEDURE — 36415 COLL VENOUS BLD VENIPUNCTURE: CPT | Performed by: PHYSICIAN ASSISTANT

## 2023-04-21 RX ORDER — LISINOPRIL AND HYDROCHLOROTHIAZIDE 12.5; 2 MG/1; MG/1
1 TABLET ORAL DAILY
Qty: 30 TABLET | Refills: 0 | Status: SHIPPED | OUTPATIENT
Start: 2023-04-21 | End: 2023-05-17

## 2023-04-21 NOTE — PROGRESS NOTES
"  Assessment & Plan     Benign essential hypertension-multiple elevated readings, will formally diagnose and treat today  Seek emergency care for any SOB, chest pain, vision issues, worsening headaches  Check BP every other day, low Na diet  - lisinopril-hydrochlorothiazide (ZESTORETIC) 20-12.5 MG tablet  Dispense: 30 tablet; Refill: 0  - VENOUS COLLECTION  - Basic Metabolic Panel (BFP)    Stop K supplement     Follow up 1 month OV    No follow-ups on file.    Massimo Moran, BECKY  [facility]    Lilly Beach is a 52 year old, presenting for the following health issues:  Recheck Medication         View : No data to display.              HPI     Patient presents for BP check. Has recently lost weight and BP continues to be elevated. More stress at work recently, checking BP and at home 155/90s on average. Father has HTN. Does get occasional headaches and eye pressure with high readings along with facial flushing. Taking K supplement (OTC dose).       Review of Systems   Constitutional, HEENT, cardiovascular, pulmonary, gi and gu systems are negative, except as otherwise noted.      Objective    BP (!) 150/94 (BP Location: Right arm, Patient Position: Chair, Cuff Size: Adult Regular)   Pulse 72   Temp 97.1  F (36.2  C)   Resp 20   Ht 1.664 m (5' 5.5\")   Wt 78.5 kg (173 lb)   BMI 28.35 kg/m    Body mass index is 28.35 kg/m .  Physical Exam   GENERAL: healthy, alert and no distress  NECK: no adenopathy, no asymmetry, masses, or scars and thyroid normal to palpation  RESP: lungs clear to auscultation - no rales, rhonchi or wheezes  CV: regular rate and rhythm, normal S1 S2, no S3 or S4, no murmur, click or rub, no peripheral edema and peripheral pulses strong  ABDOMEN: soft, nontender, no hepatosplenomegaly, no masses and bowel sounds normal  MS: no gross musculoskeletal defects noted, no edema  SKIN: no suspicious lesions or rashes  NEURO: Normal strength and tone, mentation intact and speech " normal

## 2023-05-17 DIAGNOSIS — I10 BENIGN ESSENTIAL HYPERTENSION: ICD-10-CM

## 2023-05-17 RX ORDER — LISINOPRIL AND HYDROCHLOROTHIAZIDE 12.5; 2 MG/1; MG/1
1 TABLET ORAL DAILY
Qty: 30 TABLET | Refills: 0 | COMMUNITY
Start: 2023-05-17

## 2023-05-17 RX ORDER — LISINOPRIL AND HYDROCHLOROTHIAZIDE 12.5; 2 MG/1; MG/1
1 TABLET ORAL DAILY
Qty: 10 TABLET | Refills: 0 | Status: SHIPPED | OUTPATIENT
Start: 2023-05-17 | End: 2023-05-22

## 2023-05-17 NOTE — TELEPHONE ENCOUNTER
Yong left a voicemail that he needs an extension.  Routing to Dr. Zamorano, thanks.      Pending Prescriptions:                       Disp   Refills    lisinopril-hydrochlorothiazide (ZESTORETI*10 tab*0            Sig: Take 1 tablet by mouth daily  Refused Prescriptions:                       Disp   Refills    lisinopril-hydrochlorothiazide (ZESTORETIC*30 tab*0        Sig: TAKE 1 TABLET BY MOUTH DAILY  Refused By: VALENTINA WATT  Reason for Refusal: Patient needs appointment

## 2023-05-17 NOTE — TELEPHONE ENCOUNTER
Yong Bryson is requesting a refill of:    Refused Prescriptions:                       Disp   Refills    lisinopril-hydrochlorothiazide (ZESTORETIC*30 tab*0        Sig: TAKE 1 TABLET BY MOUTH DAILY  Refused By: VALENTINA WATT  Reason for Refusal: Patient needs appointment    Pt has appt scheduled for 05/22/23. Left him a message asking if he needed extension of his medication.

## 2023-05-22 ENCOUNTER — OFFICE VISIT (OUTPATIENT)
Dept: FAMILY MEDICINE | Facility: CLINIC | Age: 53
End: 2023-05-22

## 2023-05-22 VITALS
HEART RATE: 72 BPM | WEIGHT: 169.4 LBS | BODY MASS INDEX: 27.23 KG/M2 | SYSTOLIC BLOOD PRESSURE: 106 MMHG | HEIGHT: 66 IN | TEMPERATURE: 97.2 F | RESPIRATION RATE: 20 BRPM | DIASTOLIC BLOOD PRESSURE: 62 MMHG

## 2023-05-22 DIAGNOSIS — I10 BENIGN ESSENTIAL HYPERTENSION: Primary | ICD-10-CM

## 2023-05-22 LAB
BUN SERPL-MCNC: 16 MG/DL (ref 7–25)
BUN/CREATININE RATIO: 15.7 (ref 6–32)
CALCIUM SERPL-MCNC: 9.1 MG/DL (ref 8.6–10.3)
CHLORIDE SERPLBLD-SCNC: 93.7 MMOL/L (ref 98–110)
CO2 SERPL-SCNC: 32.6 MMOL/L (ref 20–32)
CREAT SERPL-MCNC: 1.02 MG/DL (ref 0.6–1.3)
GLUCOSE SERPL-MCNC: 102 MG/DL (ref 60–99)
POTASSIUM SERPL-SCNC: 3.82 MMOL/L (ref 3.5–5.3)
SODIUM SERPL-SCNC: 134.6 MMOL/L (ref 135–146)

## 2023-05-22 PROCEDURE — 36415 COLL VENOUS BLD VENIPUNCTURE: CPT | Performed by: FAMILY MEDICINE

## 2023-05-22 PROCEDURE — 99213 OFFICE O/P EST LOW 20 MIN: CPT | Performed by: FAMILY MEDICINE

## 2023-05-22 PROCEDURE — 80048 BASIC METABOLIC PNL TOTAL CA: CPT | Performed by: FAMILY MEDICINE

## 2023-05-22 RX ORDER — LISINOPRIL AND HYDROCHLOROTHIAZIDE 12.5; 2 MG/1; MG/1
1 TABLET ORAL DAILY
Qty: 90 TABLET | Refills: 1 | Status: SHIPPED | OUTPATIENT
Start: 2023-05-22 | End: 2023-11-22

## 2023-05-22 NOTE — PROGRESS NOTES
"  Assessment & Plan     Benign essential hypertension  Well controlled with Prinzide, minimal if any side effects , home readings good, continue current medications at current doses   - lisinopril-hydrochlorothiazide (ZESTORETIC) 20-12.5 MG tablet  Dispense: 90 tablet; Refill: 1  - Basic Metabolic Panel (BFP)  - VENOUS COLLECTION      Review of the result(s) of each unique test - labs  Ordering of each unique test  Prescription drug management         FUTURE APPOINTMENTS:       - Follow-up visit in 6 mo  Regular exercise    No follow-ups on file.    Zane Zamorano MD  [facility]    Lilly Beach is a 52 year old, presenting for the following health issues:  Hypertension    HPI     Hypertension [id number] on lisinopril/HCTZ 1 mo ago, very slight am cough, no other side effects       Do you check your blood pressure regularly outside of the clinic? Yes   Home average 110-120's/70's    Are you following a low salt diet? No    Are your blood pressures ever more than 140 on the top number (systolic) OR more   than 90 on the bottom number (diastolic), for example 140/90? No      How many servings of fruits and vegetables do you eat daily?  2-3    On average, how many sweetened beverages do you drink each day (Examples: soda, juice, sweet tea, etc.  Do NOT count diet or artificially sweetened beverages)?   1    How many days per week do you exercise enough to make your heart beat faster? 5    How many minutes a day do you exercise enough to make your heart beat faster? 30 - 60    How many days per week do you miss taking your medication? 0          Review of Systems   Constitutional, HEENT, cardiovascular, pulmonary, gi and gu systems are negative, except as otherwise noted.      Objective    /62 (BP Location: Right arm, Patient Position: Chair, Cuff Size: Adult Regular)   Pulse 72   Temp 97.2  F (36.2  C) (Temporal)   Resp 20   Ht 1.664 m (5' 5.5\")   Wt 76.8 kg (169 lb 6.4 " oz)   BMI 27.76 kg/m    Body mass index is 27.76 kg/m .  Physical Exam   GENERAL: healthy, alert and no distress  NECK: no adenopathy, no asymmetry, masses, or scars and thyroid normal to palpation  RESP: lungs clear to auscultation - no rales, rhonchi or wheezes  CV: regular rate and rhythm, normal S1 S2, no S3 or S4, no murmur, click or rub, no peripheral edema and peripheral pulses strong    Office Visit on 04/21/2023   Component Date Value Ref Range Status     Carbon Dioxide 04/21/2023 33.0 (A)  20 - 32 mmol/L Final    ran twice      Creatinine 04/21/2023 1.04  0.60 - 1.30 mg/dL Final     Glucose 04/21/2023 100 (A)  60 - 99 mg/dL Final     Sodium 04/21/2023 137.7  135 - 146 mmol/L Final     Potassium 04/21/2023 4.50  3.5 - 5.3 mmol/L Final     Chloride 04/21/2023 97.6 (A)  98 - 110 mmol/L Final    ran twice      Urea Nitrogen 04/21/2023 14  7 - 25 mg/dL Final     Calcium 04/21/2023 9.4  8.6 - 10.3 mg/dL Final     BUN/Creatinine Ratio 04/21/2023 13.5  6 - 22 Final

## 2023-05-22 NOTE — NURSING NOTE
Yong Bryson is here for a BP check and refill.    Questioned patient about current smoking habits.  Pt. has never smoked.  PULSE regular  My Chart: active  CLASSIFICATION OF OVERWEIGHT AND OBESITY BY BMI                        Obesity Class           BMI(kg/m2)  Underweight                                    < 18.5  Normal                                         18.5-24.9  Overweight                                     25.0-29.9  OBESITY                     I                  30.0-34.9                             II                 35.0-39.9  EXTREME OBESITY             III                >40                            Patient's  BMI Body mass index is 27.76 kg/m .  http://hin.nhlbi.nih.gov/menuplanner/menu.cgi  Pre-visit planning  Immunizations - up to date  Colonoscopy -   Mammogram -   Asthma -   PHQ9 -    YAZMIN-7 -      The patient has verbalized that it is ok to leave a detailed voice message on the patient's cell phone with results/recommendations from this visit.

## 2023-06-19 ENCOUNTER — TRANSFERRED RECORDS (OUTPATIENT)
Dept: FAMILY MEDICINE | Facility: CLINIC | Age: 53
End: 2023-06-19

## 2023-09-20 DIAGNOSIS — E78.2 MIXED HYPERLIPIDEMIA: ICD-10-CM

## 2023-09-20 RX ORDER — SIMVASTATIN 40 MG
40 TABLET ORAL AT BEDTIME
Qty: 90 TABLET | Refills: 0 | Status: SHIPPED | OUTPATIENT
Start: 2023-09-20 | End: 2023-11-22

## 2023-09-20 NOTE — TELEPHONE ENCOUNTER
Yong Bryson is requesting a refill of:    Pending Prescriptions:                       Disp   Refills    simvastatin (ZOCOR) 40 MG tablet [Pharmac*90 tab*0            Sig: TAKE 1 TABLET(40 MG) BY MOUTH AT BEDTIME    Please close encounter if RX was sent. Thanks, Deidre    Recent Labs   Lab Test 02/20/23  0000 12/21/21  0000   CHOL 137 171   HDL 46 49   LDL 79 90   TRIG 59 162*   CHOLHDLRATIO 3 3

## 2023-11-14 DIAGNOSIS — I10 BENIGN ESSENTIAL HYPERTENSION: ICD-10-CM

## 2023-11-14 RX ORDER — LISINOPRIL AND HYDROCHLOROTHIAZIDE 12.5; 2 MG/1; MG/1
1 TABLET ORAL DAILY
COMMUNITY
Start: 2023-11-14

## 2023-11-14 NOTE — TELEPHONE ENCOUNTER
Yong Byrson is requesting a refill of:    Refused Prescriptions:                       Disp   Refills    lisinopril-hydrochlorothiazide (ZESTORETIC*                Sig: TAKE 1 TABLET BY MOUTH DAILY  Refused By: JANNET PARIS  Reason for Refusal: Patient needs appointment    Needs OV for refills

## 2023-11-22 ENCOUNTER — OFFICE VISIT (OUTPATIENT)
Dept: FAMILY MEDICINE | Facility: CLINIC | Age: 53
End: 2023-11-22

## 2023-11-22 VITALS
OXYGEN SATURATION: 97 % | TEMPERATURE: 98 F | WEIGHT: 161 LBS | HEIGHT: 66 IN | DIASTOLIC BLOOD PRESSURE: 82 MMHG | SYSTOLIC BLOOD PRESSURE: 128 MMHG | HEART RATE: 65 BPM | BODY MASS INDEX: 25.88 KG/M2

## 2023-11-22 DIAGNOSIS — I10 BENIGN ESSENTIAL HYPERTENSION: ICD-10-CM

## 2023-11-22 DIAGNOSIS — E78.2 MIXED HYPERLIPIDEMIA: ICD-10-CM

## 2023-11-22 PROCEDURE — 99214 OFFICE O/P EST MOD 30 MIN: CPT | Performed by: PHYSICIAN ASSISTANT

## 2023-11-22 RX ORDER — LISINOPRIL AND HYDROCHLOROTHIAZIDE 12.5; 2 MG/1; MG/1
1 TABLET ORAL DAILY
Qty: 90 TABLET | Refills: 1 | Status: SHIPPED | OUTPATIENT
Start: 2023-11-22 | End: 2024-05-23

## 2023-11-22 RX ORDER — SIMVASTATIN 40 MG
40 TABLET ORAL AT BEDTIME
Qty: 90 TABLET | Refills: 3 | Status: SHIPPED | OUTPATIENT
Start: 2023-11-22

## 2023-11-22 NOTE — PROGRESS NOTES
"  Assessment & Plan     Benign essential hypertension-stable, good control continues  Continue to check BP at home  Low Na diet  - lisinopril-hydrochlorothiazide (ZESTORETIC) 20-12.5 MG tablet  Dispense: 90 tablet; Refill: 1    Mixed hyperlipidemia-stable, no concerns    - simvastatin (ZOCOR) 40 MG tablet  Dispense: 90 tablet; Refill: 3          Follow up 6 months OV    No follow-ups on file.    Massimo Moran PA-C  [facility]    Lilly Beach is a 53 year old, presenting for the following health issues:  Recheck Medication (Non-fasting today, refill medications)    HPI       Hyperlipidemia Follow-Up    Are you regularly taking any medication or supplement to lower your cholesterol?   Yes- simvastatin  Are you having muscle aches or other side effects that you think could be caused by your cholesterol lowering medication?  No  No concerns.     Hypertension Follow-up    Do you check your blood pressure regularly outside of the clinic? Yes   Are you following a low salt diet? Yes  Are your blood pressures ever more than 140 on the top number (systolic) OR more   than 90 on the bottom number (diastolic), for example 140/90? No  BP at home: 130s-140s/80s. Takes at night. Takes meds in AM. No side effects from medication. Slight dizziness in AM rarely.     Has lost approx 15lbs since 2/23.     Poor sleep continues. Trazodone doesn't work for him.     Review of Systems   Constitutional, HEENT, cardiovascular, pulmonary, gi and gu systems are negative, except as otherwise noted.      Objective    /82 (BP Location: Right arm, Patient Position: Sitting, Cuff Size: Adult Large)   Pulse 65   Temp 98  F (36.7  C) (Temporal)   Ht 1.664 m (5' 5.5\")   Wt 73 kg (161 lb)   SpO2 97%   BMI 26.38 kg/m    Body mass index is 26.38 kg/m .  Physical Exam   GENERAL: healthy, alert and no distress  NECK: no adenopathy, no asymmetry, masses, or scars and thyroid normal to palpation  RESP: lungs clear to " auscultation - no rales, rhonchi or wheezes  CV: regular rate and rhythm, normal S1 S2, no S3 or S4, no murmur, click or rub, no peripheral edema and peripheral pulses strong  ABDOMEN: soft, nontender, no hepatosplenomegaly, no masses and bowel sounds normal  MS: no gross musculoskeletal defects noted, no edema  PSYCH: mentation appears normal, affect normal/bright

## 2023-11-22 NOTE — NURSING NOTE
Chief Complaint   Patient presents with    Recheck Medication     Non-fasting today, refill medications     Pre-visit Screening:  Immunizations:  not up to date - shingrix at pharmacy  Colonoscopy:  is up to date  Mammogram: NA  Asthma Action Test/Plan:  NA  PHQ9:  NA  GAD7:  NA  Questioned patient about current smoking habits Pt. has never smoked.  Ok to leave detailed message on voice mail for today's visit only Yes, phone # [phone]

## 2024-01-26 ENCOUNTER — OFFICE VISIT (OUTPATIENT)
Dept: FAMILY MEDICINE | Facility: CLINIC | Age: 54
End: 2024-01-26

## 2024-01-26 VITALS
HEART RATE: 63 BPM | RESPIRATION RATE: 18 BRPM | OXYGEN SATURATION: 99 % | DIASTOLIC BLOOD PRESSURE: 92 MMHG | SYSTOLIC BLOOD PRESSURE: 128 MMHG | WEIGHT: 165 LBS | BODY MASS INDEX: 27.04 KG/M2

## 2024-01-26 DIAGNOSIS — I10 ESSENTIAL HYPERTENSION: ICD-10-CM

## 2024-01-26 DIAGNOSIS — I83.93 VARICOSE VEINS OF BOTH LOWER EXTREMITIES, UNSPECIFIED WHETHER COMPLICATED: Primary | ICD-10-CM

## 2024-01-26 PROCEDURE — 99213 OFFICE O/P EST LOW 20 MIN: CPT | Performed by: STUDENT IN AN ORGANIZED HEALTH CARE EDUCATION/TRAINING PROGRAM

## 2024-01-26 NOTE — NURSING NOTE
Chief Complaint   Patient presents with    Consult For     Has been noticing more varicose veins more on both lower legs, worse on left leg, feels that they are continually getting worse, has been working out harder and thought it was originally popped blood vessels but now realizes it is his vein, wondering what is causing this, they have been itchy lately no matter how much lotion he puts on, mother has history of strokes and wants to check on these because he is worried about circulation problems      Pre-visit Screening:  Immunizations:  up to date  Colonoscopy:  is up to date  Mammogram: josé miguel  Asthma Action Test/Plan:  na  PHQ9:  phq2 done today  GAD7:  na  Questioned patient about current smoking habits Pt. has never smoked.  Ok to leave detailed message on voice mail for today's visit only yes, phone # [phone] (home) [phone] (work)

## 2024-01-26 NOTE — PROGRESS NOTES
Assessment & Plan     No diagnosis found.     There are no Patient Instructions on file for this visit.     Reasons to follow-up sooner or seek emergent care reviewed.     >*** minutes spent on the date of the encounter doing chart review, history and exam, documentation and further activities per the note      Donnell Scott MD, [facility]      Subjective     Yong Bryson is a 53 year old male who presents to clinic today for the following health issues:    HPI   Chief Complaint   Patient presents with    Consult For     Has been noticing more varicose veins more on both lower legs, worse on left leg, feels that they are continually getting worse, has been working out harder and thought it was originally popped blood vessels but now realizes it is his vein, wondering what is causing this, they have been itchy lately no matter how much lotion he puts on, mother has history of strokes and wants to check on these because he is worried about circulation problems       {SUPERLIST (Optional):700645}  {additonal problems for provider to add (Optional):826881}        Objective    BP (!) 128/92 (BP Location: Left arm, Patient Position: Sitting, Cuff Size: Adult Large)   Pulse 63   Resp 18   Wt 74.8 kg (165 lb)   SpO2 99%   BMI 27.04 kg/m    Body mass index is 27.04 kg/m .  Alert, NAD  NC/AT  Sclerae anicteric  Regular  Resp nonlabored  Skin warm and dry  No focal neuro deficits. Speech intact. Normal gait.  Appropriate affect    ***     Labs reviewed.  {Diagnostic Test Results (Optional):420747}

## 2024-01-26 NOTE — PATIENT INSTRUCTIONS
Consider compression socks     Vein consult at:  [facility]  [address]  [place] [address]  [phone] -- appt line    Check BP at home a few times and call if above 135/85

## 2024-01-26 NOTE — PROGRESS NOTES
Assessment & Plan       ICD-10-CM    1. Varicose veins of both lower extremities, unspecified whether complicated  I83.93 Vascular Medicine Referral         Reviewed nature of varicose veins and tx options. Presentation not c/w DVT. We discussed superficial venous changes are not a significant stroke risk factor. Encouraged continued physical activtity, BP monitoring and follow-up with PCP.     Patient Instructions   Consider compression socks     Vein consult at:  [facility]  [address]  [place] [address]  [phone] -- appt line    Check BP at home a few times and call if above 135/85     Reasons to follow-up sooner or seek emergent care reviewed.       Donnell Scott MD, [facility]      Subjective     Yong Bryson is a 53 year old male who presents to clinic today for the following health issues:    HPI   Chief Complaint   Patient presents with    Consult For     Has been noticing more varicose veins more on both lower legs, worse on left leg, feels that they are continually getting worse, has been working out harder and thought it was originally popped blood vessels but now realizes it is his vein, wondering what is causing this, they have been itchy lately no matter how much lotion he puts on, mother has history of strokes and wants to check on these because he is worried about circulation problems       No edema, minimal discomfort  No recent travel/immobility.   Primary concern is vascular health, minimizing stroke risk.  Hasn't been checking BP at home recently. Started meds spring 2023 and had been well controlled last two visits  BP Readings from Last 6 Encounters:   01/26/24 (!) 128/92   11/22/23 128/82   05/22/23 106/62   04/21/23 (!) 150/94   02/20/23 (!) 142/74   12/21/21 138/84             Objective    BP (!) 128/92 (BP Location: Left arm, Patient Position: Sitting, Cuff Size: Adult Large)   Pulse 63   Resp 18   Wt 74.8 kg (165 lb)    SpO2 99%   BMI 27.04 kg/m    Body mass index is 27.04 kg/m .  Alert, NAD  NC/AT  Sclerae anicteric  RRR  Resp nonlabored  Skin warm and dry, scattered spider veins and varicosities BLE, mild.  No focal neuro deficits. Speech intact. Normal gait.  Appropriate affect       Labs reviewed.

## 2024-02-16 ENCOUNTER — TRANSFERRED RECORDS (OUTPATIENT)
Dept: FAMILY MEDICINE | Facility: CLINIC | Age: 54
End: 2024-02-16

## 2024-05-19 DIAGNOSIS — I10 BENIGN ESSENTIAL HYPERTENSION: ICD-10-CM

## 2024-05-20 RX ORDER — LISINOPRIL AND HYDROCHLOROTHIAZIDE 12.5; 2 MG/1; MG/1
1 TABLET ORAL DAILY
COMMUNITY
Start: 2024-05-20

## 2024-05-20 NOTE — TELEPHONE ENCOUNTER
Yong Bryson is requesting a refill of:    Refused Prescriptions:                       Disp   Refills    lisinopril-hydrochlorothiazide (ZESTORETIC*                Sig: TAKE 1 TABLET BY MOUTH DAILY  Refused By: VALENTINA WATT  Reason for Refusal: Patient needs appointment    Pt due for OV

## 2024-05-23 ENCOUNTER — OFFICE VISIT (OUTPATIENT)
Dept: FAMILY MEDICINE | Facility: CLINIC | Age: 54
End: 2024-05-23

## 2024-05-23 VITALS
SYSTOLIC BLOOD PRESSURE: 124 MMHG | BODY MASS INDEX: 26.36 KG/M2 | WEIGHT: 164 LBS | HEIGHT: 66 IN | DIASTOLIC BLOOD PRESSURE: 82 MMHG | TEMPERATURE: 99.2 F | HEART RATE: 70 BPM | OXYGEN SATURATION: 99 %

## 2024-05-23 DIAGNOSIS — Z80.42 FH: PROSTATE CANCER: ICD-10-CM

## 2024-05-23 DIAGNOSIS — E78.2 MIXED HYPERLIPIDEMIA: Primary | ICD-10-CM

## 2024-05-23 DIAGNOSIS — I10 ESSENTIAL HYPERTENSION: ICD-10-CM

## 2024-05-23 PROCEDURE — G2211 COMPLEX E/M VISIT ADD ON: HCPCS | Performed by: FAMILY MEDICINE

## 2024-05-23 PROCEDURE — 99214 OFFICE O/P EST MOD 30 MIN: CPT | Performed by: FAMILY MEDICINE

## 2024-05-23 RX ORDER — LISINOPRIL AND HYDROCHLOROTHIAZIDE 12.5; 2 MG/1; MG/1
1 TABLET ORAL DAILY
Qty: 90 TABLET | Refills: 1 | Status: SHIPPED | OUTPATIENT
Start: 2024-05-23

## 2024-05-23 NOTE — PROGRESS NOTES
"  Assessment & Plan     Mixed hyperlipidemia  Control uncertain, not fasting today, continue current medications at current doses pending standing labs  - Lipid Panel ([facility])  - Comprehensive Metobolic Panel ([facility])  - VENOUS COLLECTION    Essential hypertension  Well controlled after some temporary rise due to stress  - Comprehensive Metobolic Panel ([facility])  - VENOUS COLLECTION  - lisinopril-hydrochlorothiazide (ZESTORETIC) 20-12.5 MG tablet  Dispense: 90 tablet; Refill: 1    FH: prostate cancer    - PSA Total ([facility])  - VENOUS COLLECTION            BMI  Estimated body mass index is 26.88 kg/m  as calculated from the following:    Height as of this encounter: 1.664 m (5' 5.5\").    Weight as of this encounter: 74.4 kg (164 lb).         FUTURE APPOINTMENTS:       - Follow-up visit in 6 mo  Regular exercise    No follow-ups on file.    Lilly Beach is a 53 year old, presenting for the following health issues:  Recheck Medication (Pt here to recheck his medication and get refills )    HPI       Hyperlipidemia Follow-Up    Are you regularly taking any medication or supplement to lower your cholesterol?   Yes- simvastatin  Are you having muscle aches or other side effects that you think could be caused by your cholesterol lowering medication?  No    Hypertension [id number] was high while going through some stress at home -now better    Do you check your blood pressure regularly outside of the clinic? Yes   Are you following a low salt diet? No  Are your blood pressures ever more than 140 on the top number (systolic) OR more   than 90 on the bottom number (diastolic), for example 140/90? No  How many servings of fruits and vegetables do you eat daily?  2-3  On average, how many sweetened beverages do you drink each day (Examples: soda, juice, sweet tea, etc.  Do NOT count diet or artificially sweetened beverages)?   1  How many days per week do you exercise enough to make your heart beat faster? 3 or less  How many " "minutes a day do you exercise enough to make your heart beat faster? 20 - 29  How many days per week do you miss taking your medication? 0        Review of Systems  Constitutional, HEENT, cardiovascular, pulmonary, gi and gu systems are negative, except as otherwise noted.      Objective    /82 (BP Location: Right arm, Patient Position: Sitting, Cuff Size: Adult Large)   Pulse 70   Temp 99.2  F (37.3  C)   Ht 1.664 m (5' 5.5\")   Wt 74.4 kg (164 lb)   SpO2 99%   BMI 26.88 kg/m    Body mass index is 26.88 kg/m .  Physical Exam   GENERAL: alert and no distress  NECK: no adenopathy, no asymmetry, masses, or scars  RESP: lungs clear to auscultation - no rales, rhonchi or wheezes  CV: regular rate and rhythm, normal S1 S2, no S3 or S4, no murmur, click or rub, no peripheral edema  ABDOMEN: soft, nontender, no hepatosplenomegaly, no masses and bowel sounds normal  MS: no gross musculoskeletal defects noted, no edema  PSYCH: mentation appears normal, affect normal/bright    Office Visit on 05/22/2023   Component Date Value Ref Range Status    Carbon Dioxide 05/22/2023 32.6 (A)  20 - 32 mmol/L Final    Creatinine 05/22/2023 1.02  0.60 - 1.30 mg/dL Final    Glucose 05/22/2023 102 (A)  60 - 99 mg/dL Final    Sodium 05/22/2023 134.6 (A)  135 - 146 mmol/L Final    Potassium 05/22/2023 3.82  3.5 - 5.3 mmol/L Final    Chloride 05/22/2023 93.7 (A)  98 - 110 mmol/L Final    Urea Nitrogen 05/22/2023 16  7 - 25 mg/dL Final    Calcium 05/22/2023 9.1  8.6 - 10.3 mg/dL Final    BUN/Creatinine Ratio 05/22/2023 15.7  6 - 32 Final           Signed Electronically by: Zane Zamorano MD      "

## 2024-05-23 NOTE — NURSING NOTE
Chief Complaint   Patient presents with    Recheck Medication     Pt here to recheck his medication and get refills

## 2024-05-26 ENCOUNTER — HEALTH MAINTENANCE LETTER (OUTPATIENT)
Age: 54
End: 2024-05-26

## 2024-05-29 DIAGNOSIS — Z80.42 FH: PROSTATE CANCER: ICD-10-CM

## 2024-05-29 DIAGNOSIS — I10 ESSENTIAL HYPERTENSION: ICD-10-CM

## 2024-05-29 DIAGNOSIS — E78.2 MIXED HYPERLIPIDEMIA: ICD-10-CM

## 2024-05-29 LAB
ALBUMIN SERPL-MCNC: 4.3 G/DL (ref 3.6–5.1)
ALP SERPL-CCNC: 60 U/L (ref 33–130)
ALT 1742-6: 16 U/L (ref 0–32)
AST 1920-8: 15 U/L (ref 0–35)
BILIRUB SERPL-MCNC: 0.9 MG/DL (ref 0.2–1.2)
BUN SERPL-MCNC: 14 MG/DL (ref 7–25)
BUN/CREATININE RATIO: 12 (ref 6–32)
CALCIUM SERPL-MCNC: 8.9 MG/DL (ref 8.6–10.3)
CHLORIDE SERPLBLD-SCNC: 108.7 MMOL/L (ref 98–110)
CHOLEST SERPL-MCNC: 159 MG/DL (ref 0–199)
CHOLEST/HDLC SERPL: 3 {RATIO} (ref 0–5)
CO2 SERPL-SCNC: 27.9 MMOL/L (ref 20–32)
CREAT SERPL-MCNC: 1.18 MG/DL (ref 0.6–1.3)
GLUCOSE SERPL-MCNC: 89 MG/DL (ref 60–99)
HDLC SERPL-MCNC: 51 MG/DL (ref 40–150)
LDLC SERPL CALC-MCNC: 80 MG/DL (ref 0–130)
POTASSIUM SERPL-SCNC: 4.12 MMOL/L (ref 3.5–5.3)
PROT SERPL-MCNC: 7 G/DL (ref 6.1–8.1)
SODIUM SERPL-SCNC: 144.9 MMOL/L (ref 135–146)
TRIGL SERPL-MCNC: 139 MG/DL (ref 0–149)

## 2024-05-29 PROCEDURE — 36415 COLL VENOUS BLD VENIPUNCTURE: CPT | Performed by: FAMILY MEDICINE

## 2024-05-29 PROCEDURE — 80061 LIPID PANEL: CPT | Performed by: FAMILY MEDICINE

## 2024-05-29 PROCEDURE — 80053 COMPREHEN METABOLIC PANEL: CPT | Performed by: FAMILY MEDICINE

## 2024-05-30 LAB — ABBOTT PSA - QUEST: 0.58 NG/ML

## 2024-12-05 DIAGNOSIS — I10 ESSENTIAL HYPERTENSION: ICD-10-CM

## 2024-12-05 RX ORDER — LISINOPRIL AND HYDROCHLOROTHIAZIDE 12.5; 2 MG/1; MG/1
1 TABLET ORAL DAILY
Qty: 30 TABLET | Refills: 0 | Status: SHIPPED | OUTPATIENT
Start: 2024-12-05

## 2024-12-05 RX ORDER — LISINOPRIL AND HYDROCHLOROTHIAZIDE 12.5; 2 MG/1; MG/1
1 TABLET ORAL DAILY
COMMUNITY
Start: 2024-12-05

## 2024-12-05 NOTE — TELEPHONE ENCOUNTER
Yong Bryson is requesting a refill of:    Refused Prescriptions:                       Disp   Refills    lisinopril-hydrochlorothiazide (ZESTORETIC*                Sig: TAKE 1 TABLET BY MOUTH DAILY  Refused By: VALENTINA WATT  Reason for Refusal: Patient needs appointment  Reason for Refusal Comment: 30 tablet sent on 12/05

## 2024-12-05 NOTE — TELEPHONE ENCOUNTER
Pt scheduled appt for 12/23 needs extension of lisinopril-hydrochlorothiazide.    Yong Bryson is requesting a refill of:    Pending Prescriptions:                       Disp   Refills    lisinopril-hydrochlorothiazide (ZESTORETI*30 tab*0            Sig: Take 1 tablet by mouth daily.

## 2024-12-23 ENCOUNTER — OFFICE VISIT (OUTPATIENT)
Dept: FAMILY MEDICINE | Facility: CLINIC | Age: 54
End: 2024-12-23

## 2024-12-23 VITALS
BODY MASS INDEX: 28.74 KG/M2 | SYSTOLIC BLOOD PRESSURE: 120 MMHG | TEMPERATURE: 97.3 F | HEART RATE: 74 BPM | DIASTOLIC BLOOD PRESSURE: 74 MMHG | WEIGHT: 175.4 LBS | OXYGEN SATURATION: 99 %

## 2024-12-23 DIAGNOSIS — E78.2 MIXED HYPERLIPIDEMIA: Primary | ICD-10-CM

## 2024-12-23 DIAGNOSIS — Z23 NEED FOR VACCINATION: ICD-10-CM

## 2024-12-23 DIAGNOSIS — I10 ESSENTIAL HYPERTENSION: ICD-10-CM

## 2024-12-23 LAB
ALBUMIN SERPL-MCNC: 4.6 G/DL (ref 3.6–5.1)
ALP SERPL-CCNC: 72 U/L (ref 33–130)
ALT 1742-6: 14 U/L (ref 0–32)
AST 1920-8: 16 U/L (ref 0–35)
BILIRUB SERPL-MCNC: 0.9 MG/DL (ref 0.2–1.2)
BUN SERPL-MCNC: 11 MG/DL (ref 7–25)
BUN/CREATININE RATIO: 11 (ref 6–32)
CALCIUM SERPL-MCNC: 9.1 MG/DL (ref 8.6–10.3)
CHLORIDE SERPLBLD-SCNC: 100.2 MMOL/L (ref 98–110)
CHOLEST SERPL-MCNC: 182 MG/DL (ref 0–199)
CHOLEST/HDLC SERPL: 3 {RATIO} (ref 0–5)
CO2 SERPL-SCNC: 32.3 MMOL/L (ref 20–32)
CREAT SERPL-MCNC: 0.97 MG/DL (ref 0.6–1.3)
GLUCOSE SERPL-MCNC: 83 MG/DL (ref 60–99)
HDLC SERPL-MCNC: 68 MG/DL (ref 40–150)
LDLC SERPL CALC-MCNC: 96 MG/DL (ref 0–129)
POTASSIUM SERPL-SCNC: 4.44 MMOL/L (ref 3.5–5.3)
PROT SERPL-MCNC: 7.8 G/DL (ref 6.1–8.1)
SODIUM SERPL-SCNC: 140.3 MMOL/L (ref 135–146)
TRIGL SERPL-MCNC: 88 MG/DL (ref 0–149)

## 2024-12-23 RX ORDER — LISINOPRIL AND HYDROCHLOROTHIAZIDE 12.5; 2 MG/1; MG/1
1 TABLET ORAL DAILY
Qty: 90 TABLET | Refills: 3 | Status: SHIPPED | OUTPATIENT
Start: 2024-12-23

## 2024-12-23 RX ORDER — SIMVASTATIN 40 MG
40 TABLET ORAL AT BEDTIME
Qty: 90 TABLET | Refills: 3 | Status: SHIPPED | OUTPATIENT
Start: 2024-12-23

## 2024-12-23 NOTE — NURSING NOTE
Chief Complaint   Patient presents with    Recheck Medication     Fasting med check and refill      Pre-visit Screening:  Immunizations:  not up to date - will do today   Colonoscopy:  is up to date  Mammogram:   Asthma Action Test/Plan:    PHQ9:    GAD7:    Questioned patient about current smoking habits Pt. has never smoked.  Ok to leave detailed message on voice mail for today's visit only yes, phone # [phone] (home) [phone] (work)

## 2025-06-14 ENCOUNTER — HEALTH MAINTENANCE LETTER (OUTPATIENT)
Age: 55
End: 2025-06-14

## 2025-07-09 DIAGNOSIS — E78.2 MIXED HYPERLIPIDEMIA: ICD-10-CM

## 2025-07-09 RX ORDER — SIMVASTATIN 40 MG
40 TABLET ORAL AT BEDTIME
COMMUNITY
Start: 2025-07-09

## 2025-07-09 NOTE — TELEPHONE ENCOUNTER
Yong Bryson is requesting a refill of:    Refused Prescriptions:                       Disp   Refills    simvastatin (ZOCOR) 40 MG tablet [Pharmacy*                Sig: TAKE 1 TABLET(40 MG) BY MOUTH AT BEDTIME  Refused By: JANNET PARIS  Reason for Refusal: Duplicate    Sent 1 year on 12/23/24

## 2025-07-22 SDOH — HEALTH STABILITY: PHYSICAL HEALTH: ON AVERAGE, HOW MANY MINUTES DO YOU ENGAGE IN EXERCISE AT THIS LEVEL?: 20 MIN

## 2025-07-22 SDOH — HEALTH STABILITY: PHYSICAL HEALTH: ON AVERAGE, HOW MANY DAYS PER WEEK DO YOU ENGAGE IN MODERATE TO STRENUOUS EXERCISE (LIKE A BRISK WALK)?: 2 DAYS

## 2025-07-22 ASSESSMENT — SOCIAL DETERMINANTS OF HEALTH (SDOH): HOW OFTEN DO YOU GET TOGETHER WITH FRIENDS OR RELATIVES?: ONCE A WEEK

## 2025-07-23 ENCOUNTER — E-VISIT (OUTPATIENT)
Dept: INTERNAL MEDICINE | Facility: CLINIC | Age: 55
End: 2025-07-23
Payer: COMMERCIAL

## 2025-07-23 ENCOUNTER — OFFICE VISIT (OUTPATIENT)
Dept: INTERNAL MEDICINE | Facility: CLINIC | Age: 55
End: 2025-07-23
Payer: COMMERCIAL

## 2025-07-23 VITALS
DIASTOLIC BLOOD PRESSURE: 86 MMHG | RESPIRATION RATE: 16 BRPM | HEIGHT: 65 IN | WEIGHT: 179.2 LBS | BODY MASS INDEX: 29.85 KG/M2 | OXYGEN SATURATION: 98 % | HEART RATE: 87 BPM | SYSTOLIC BLOOD PRESSURE: 125 MMHG | TEMPERATURE: 98.3 F

## 2025-07-23 DIAGNOSIS — E78.2 MIXED HYPERLIPIDEMIA: ICD-10-CM

## 2025-07-23 DIAGNOSIS — I10 ESSENTIAL HYPERTENSION: Primary | ICD-10-CM

## 2025-07-23 DIAGNOSIS — A37.90 WHOOPING COUGH: Primary | ICD-10-CM

## 2025-07-23 DIAGNOSIS — Z13.1 SCREENING FOR DIABETES MELLITUS: ICD-10-CM

## 2025-07-23 DIAGNOSIS — H61.22 IMPACTED CERUMEN OF LEFT EAR: ICD-10-CM

## 2025-07-23 DIAGNOSIS — Z12.5 SCREENING FOR PROSTATE CANCER: ICD-10-CM

## 2025-07-23 DIAGNOSIS — Z13.0 SCREENING, IRON DEFICIENCY ANEMIA: ICD-10-CM

## 2025-07-23 LAB
ERYTHROCYTE [DISTWIDTH] IN BLOOD BY AUTOMATED COUNT: 11.9 % (ref 10–15)
EST. AVERAGE GLUCOSE BLD GHB EST-MCNC: 103 MG/DL
HBA1C MFR BLD: 5.2 % (ref 0–5.6)
HCT VFR BLD AUTO: 44.5 % (ref 40–53)
HGB BLD-MCNC: 15.6 G/DL (ref 13.3–17.7)
MCH RBC QN AUTO: 31.8 PG (ref 26.5–33)
MCHC RBC AUTO-ENTMCNC: 35.1 G/DL (ref 31.5–36.5)
MCV RBC AUTO: 91 FL (ref 78–100)
PLATELET # BLD AUTO: 250 10E3/UL (ref 150–450)
RBC # BLD AUTO: 4.9 10E6/UL (ref 4.4–5.9)
WBC # BLD AUTO: 6.6 10E3/UL (ref 4–11)

## 2025-07-23 PROCEDURE — 99207 PR NON-BILLABLE SERV PER CHARTING: CPT | Performed by: STUDENT IN AN ORGANIZED HEALTH CARE EDUCATION/TRAINING PROGRAM

## 2025-07-23 PROCEDURE — 36415 COLL VENOUS BLD VENIPUNCTURE: CPT | Performed by: STUDENT IN AN ORGANIZED HEALTH CARE EDUCATION/TRAINING PROGRAM

## 2025-07-23 PROCEDURE — 3044F HG A1C LEVEL LT 7.0%: CPT | Performed by: STUDENT IN AN ORGANIZED HEALTH CARE EDUCATION/TRAINING PROGRAM

## 2025-07-23 PROCEDURE — 99213 OFFICE O/P EST LOW 20 MIN: CPT | Mod: 25 | Performed by: STUDENT IN AN ORGANIZED HEALTH CARE EDUCATION/TRAINING PROGRAM

## 2025-07-23 PROCEDURE — 99396 PREV VISIT EST AGE 40-64: CPT | Mod: 25 | Performed by: STUDENT IN AN ORGANIZED HEALTH CARE EDUCATION/TRAINING PROGRAM

## 2025-07-23 PROCEDURE — 69209 REMOVE IMPACTED EAR WAX UNI: CPT | Mod: LT | Performed by: STUDENT IN AN ORGANIZED HEALTH CARE EDUCATION/TRAINING PROGRAM

## 2025-07-23 PROCEDURE — 3074F SYST BP LT 130 MM HG: CPT | Performed by: STUDENT IN AN ORGANIZED HEALTH CARE EDUCATION/TRAINING PROGRAM

## 2025-07-23 PROCEDURE — 80061 LIPID PANEL: CPT | Performed by: STUDENT IN AN ORGANIZED HEALTH CARE EDUCATION/TRAINING PROGRAM

## 2025-07-23 PROCEDURE — 85027 COMPLETE CBC AUTOMATED: CPT | Performed by: STUDENT IN AN ORGANIZED HEALTH CARE EDUCATION/TRAINING PROGRAM

## 2025-07-23 PROCEDURE — 83036 HEMOGLOBIN GLYCOSYLATED A1C: CPT | Performed by: STUDENT IN AN ORGANIZED HEALTH CARE EDUCATION/TRAINING PROGRAM

## 2025-07-23 PROCEDURE — 80053 COMPREHEN METABOLIC PANEL: CPT | Performed by: STUDENT IN AN ORGANIZED HEALTH CARE EDUCATION/TRAINING PROGRAM

## 2025-07-23 PROCEDURE — G0103 PSA SCREENING: HCPCS | Performed by: STUDENT IN AN ORGANIZED HEALTH CARE EDUCATION/TRAINING PROGRAM

## 2025-07-23 PROCEDURE — 3079F DIAST BP 80-89 MM HG: CPT | Performed by: STUDENT IN AN ORGANIZED HEALTH CARE EDUCATION/TRAINING PROGRAM

## 2025-07-23 RX ORDER — LISINOPRIL AND HYDROCHLOROTHIAZIDE 12.5; 2 MG/1; MG/1
1 TABLET ORAL DAILY
Qty: 90 TABLET | Refills: 3 | Status: SHIPPED | OUTPATIENT
Start: 2025-07-23

## 2025-07-23 RX ORDER — SIMVASTATIN 40 MG
40 TABLET ORAL AT BEDTIME
Qty: 90 TABLET | Refills: 3 | Status: SHIPPED | OUTPATIENT
Start: 2025-07-23

## 2025-07-23 NOTE — NURSING NOTE
Patient identified using two patient identifiers.  Ear exam showing wax occlusion completed by provider.  Solution: warm water and H202/H20 was placed in the left ear(s) via irrigation tool: elephant ear.

## 2025-07-23 NOTE — PROGRESS NOTES
"Preventive Care Visit  [facility]  Juwan Albright MD, Internal Medicine  Jul 23, 2025    Assessment & Plan     (I10) Essential hypertension  (primary encounter diagnosis)  - BP controlled with zestoretic  - No side effects reported  Plan: lisinopril-hydrochlorothiazide (ZESTORETIC)         20-12.5 MG tablet        CMP    (E78.2) Mixed hyperlipidemia  - On statin, no complaints  Plan: Lipid panel reflex to direct LDL Fasting,         simvastatin (ZOCOR) 40 MG tablet, Comprehensive        metabolic panel (BMP + Alb, Alk Phos, ALT, AST,        Total. Bili, TP)    (H61.22) Impacted cerumen of left ear  - Significant left ear impacted cerumen, some hearing loss on left. Right ear minimal   Plan: DC REMOVAL IMPACTED CERUMEN IRRIGATION/LVG         UNILAT        Successful disimpaction by staff using washout kit   Advised debrox 1-2 times monthly    (Z12.5) Screening for prostate cancer  - Family history  Plan: PSA, screen    BMI  Estimated body mass index is 29.82 kg/m  as calculated from the following:    Height as of this encounter: 1.651 m (5' 5\").    Weight as of this encounter: 81.3 kg (179 lb 3.2 oz).       Counseling  Appropriate preventive services were addressed with this patient via screening, questionnaire, or discussion as appropriate for fall prevention, nutrition, physical activity, Tobacco-use cessation, social engagement, weight loss and cognition.  Checklist reviewing preventive services available has been given to the patient.  Reviewed patient's diet, addressing concerns and/or questions.   He is at risk for lack of exercise and has been provided with information to increase physical activity for the benefit of his well-being.   He is at risk for psychosocial distress and has been provided with information to reduce risk.         Lilly Beach is a 55 year old, presenting for the following:  Physical (Fasting.)        7/23/2025     8:39 AM   Additional Questions   Roomed by " NICHOLAS Molina   Accompanied by Self            Advance Care Planning  Discussed advance care planning with patient; informed AVS has link to [facility].        7/22/2025   General Health   How would you rate your overall physical health? Good   Feel stress (tense, anxious, or unable to sleep) Very much   (!) STRESS CONCERN      7/22/2025   Nutrition   Three or more servings of calcium each day? (!) I DON'T KNOW   Diet: Low salt   How many servings of fruit and vegetables per day? (!) 2-3   How many sweetened beverages each day? 0-1         7/22/2025   Exercise   Days per week of moderate/strenous exercise 2 days   Average minutes spent exercising at this level 20 min   (!) EXERCISE CONCERN      7/22/2025   Social Factors   Frequency of gathering with friends or relatives Once a week   Worry food won't last until get money to buy more No   Food not last or not have enough money for food? No   Do you have housing? (Housing is defined as stable permanent housing and does not include staying outside in a car, in a tent, in an abandoned building, in an overnight shelter, or couch-surfing.) Yes   Are you worried about losing your housing? No   Lack of transportation? No   Unable to get utilities (heat,electricity)? No         7/22/2025   Fall Risk   Fallen 2 or more times in the past year? No   Trouble with walking or balance? No          7/22/2025   Dental   Dentist two times every year? Yes         Today's PHQ-2 Score:       7/22/2025     7:36 PM   PHQ-2 ( 1999 [employer])   Q1: Little interest or pleasure in doing things 0   Q2: Feeling down, depressed or hopeless 1   PHQ-2 Score 1    Q1: Little interest or pleasure in doing things Not at all   Q2: Feeling down, depressed or hopeless Several days   PHQ-2 Score 1       Patient-reported           7/22/2025   Substance Use   Alcohol more than 3/day or more than 7/wk No   Do you use any other substances recreationally? No     Social History     Tobacco Use    Smoking status:  "Never     Passive exposure: Never    Smokeless tobacco: Never   Vaping Use    Vaping status: Never Used   Substance Use Topics    Alcohol use: Yes     Alcohol/week: 4.0 standard drinks of alcohol     Types: 4 Standard drinks or equivalent per week    Drug use: No           7/22/2025   STI Screening   New sexual partner(s) since last STI/HIV test? No   ASCVD Risk   The 10-year ASCVD risk score (Socrates KRISHNAN, et al., 2019) is: 3.5%    Values used to calculate the score:      Age: 55 years      Sex: Male      Is Non- : No      Diabetic: No      Tobacco smoker: No      Systolic Blood Pressure: 125 mmHg      Is BP treated: Yes      HDL Cholesterol: 68 mg/dL      Total Cholesterol: 182 mg/dL      Past Medical History:   Diagnosis Date    Mixed hyperlipidemia      Past Surgical History:   Procedure Laterality Date    PHOTOREFRACTIVE KERATECTOMY  2008    bilateral         Review of Systems  Constitutional, neuro, ENT, endocrine, pulmonary, cardiac, gastrointestinal, genitourinary, musculoskeletal, integument and psychiatric systems are negative, except as otherwise noted.     Objective    Exam  /86 (BP Location: Left arm, Patient Position: Sitting, Cuff Size: Adult Regular)   Pulse 87   Temp 98.3  F (36.8  C) (Oral)   Resp 16   Ht 1.651 m (5' 5\")   Wt 81.3 kg (179 lb 3.2 oz)   SpO2 98%   BMI 29.82 kg/m     Estimated body mass index is 29.82 kg/m  as calculated from the following:    Height as of this encounter: 1.651 m (5' 5\").    Weight as of this encounter: 81.3 kg (179 lb 3.2 oz).    Physical Exam  Vitals reviewed.   Constitutional:       Appearance: Normal appearance.   HENT:      Head: Atraumatic.      Right Ear: There is impacted cerumen.      Left Ear: There is impacted cerumen.   Eyes:      Extraocular Movements: Extraocular movements intact.   Cardiovascular:      Rate and Rhythm: Normal rate and regular rhythm.   Pulmonary:      Breath sounds: Normal breath sounds. "   Abdominal:      General: Abdomen is flat.   Musculoskeletal:         General: Normal range of motion.   Skin:     General: Skin is warm.   Neurological:      General: No focal deficit present.   Psychiatric:         Mood and Affect: Mood normal.         Thought Content: Thought content normal.           Signed Electronically by: Juwan Albright MD

## 2025-07-24 ENCOUNTER — OFFICE VISIT (OUTPATIENT)
Dept: FAMILY MEDICINE | Facility: CLINIC | Age: 55
End: 2025-07-24

## 2025-07-24 VITALS
SYSTOLIC BLOOD PRESSURE: 126 MMHG | TEMPERATURE: 98 F | BODY MASS INDEX: 29.95 KG/M2 | DIASTOLIC BLOOD PRESSURE: 84 MMHG | HEART RATE: 79 BPM | OXYGEN SATURATION: 98 % | WEIGHT: 180 LBS

## 2025-07-24 DIAGNOSIS — Z20.818 PERTUSSIS EXPOSURE: Primary | ICD-10-CM

## 2025-07-24 LAB
ALBUMIN SERPL BCG-MCNC: 4.8 G/DL (ref 3.5–5.2)
ALP SERPL-CCNC: 97 U/L (ref 40–150)
ALT SERPL W P-5'-P-CCNC: 25 U/L (ref 0–70)
ANION GAP SERPL CALCULATED.3IONS-SCNC: 14 MMOL/L (ref 7–15)
AST SERPL W P-5'-P-CCNC: 27 U/L (ref 0–45)
BILIRUB SERPL-MCNC: 0.6 MG/DL
BUN SERPL-MCNC: 10 MG/DL (ref 6–20)
CALCIUM SERPL-MCNC: 9.4 MG/DL (ref 8.8–10.4)
CHLORIDE SERPL-SCNC: 99 MMOL/L (ref 98–107)
CHOLEST SERPL-MCNC: 202 MG/DL
CREAT SERPL-MCNC: 0.97 MG/DL (ref 0.67–1.17)
EGFRCR SERPLBLD CKD-EPI 2021: >90 ML/MIN/1.73M2
FASTING STATUS PATIENT QL REPORTED: YES
FASTING STATUS PATIENT QL REPORTED: YES
GLUCOSE SERPL-MCNC: 97 MG/DL (ref 70–99)
HCO3 SERPL-SCNC: 27 MMOL/L (ref 22–29)
HDLC SERPL-MCNC: 67 MG/DL
LDLC SERPL CALC-MCNC: 122 MG/DL
NONHDLC SERPL-MCNC: 135 MG/DL
POTASSIUM SERPL-SCNC: 4.4 MMOL/L (ref 3.4–5.3)
PROT SERPL-MCNC: 8 G/DL (ref 6.4–8.3)
PSA SERPL DL<=0.01 NG/ML-MCNC: 0.83 NG/ML (ref 0–3.5)
SODIUM SERPL-SCNC: 140 MMOL/L (ref 135–145)
TRIGL SERPL-MCNC: 66 MG/DL

## 2025-07-24 PROCEDURE — 99213 OFFICE O/P EST LOW 20 MIN: CPT | Performed by: PHYSICIAN ASSISTANT

## 2025-07-24 PROCEDURE — G2211 COMPLEX E/M VISIT ADD ON: HCPCS | Performed by: PHYSICIAN ASSISTANT

## 2025-07-24 RX ORDER — AZITHROMYCIN 250 MG/1
TABLET, FILM COATED ORAL
Qty: 6 TABLET | Refills: 0 | Status: SHIPPED | OUTPATIENT
Start: 2025-07-24 | End: 2025-07-29

## 2025-07-24 NOTE — PROGRESS NOTES
Assessment & Plan     Pertussis exposure-will treat for exposure  Monitor for worsening, avoid contact with vulnerable and/or immunocompromised persons or groups  - azithromycin (ZITHROMAX) 250 MG tablet  Dispense: 6 tablet; Refill: 0      Follow up as needed    Subjective   Yong is a 55 year old, presenting for the following health issues:  Consult (Daughter came back from [place] tested positive for whooping cough, he has no symptoms )    HPI      Pt exposed to daughter who was diagnosed with pertussis 2 days ago. He is vaccinated. Family is all on medication now. Daughter came back from [place] and developed cough. Pt feels well today, no cough.       Review of Systems  Constitutional, neuro, ENT, endocrine, pulmonary, cardiac, gastrointestinal, genitourinary, musculoskeletal, integument and psychiatric systems are negative, except as otherwise noted.      Objective    /84 (BP Location: Right arm, Patient Position: Sitting, Cuff Size: Adult Large)   Pulse 79   Temp 98  F (36.7  C) (Temporal)   Wt 81.6 kg (180 lb)   SpO2 98%   BMI 29.95 kg/m    Body mass index is 29.95 kg/m .  Physical Exam   GENERAL: alert and no distress  NECK: no adenopathy, no asymmetry, masses, or scars  RESP: lungs clear to auscultation - no rales, rhonchi or wheezes  CV: regular rate and rhythm, normal S1 S2, no S3 or S4, no murmur, click or rub, no peripheral edema  ABDOMEN: soft, nontender, no hepatosplenomegaly, no masses and bowel sounds normal  MS: no gross musculoskeletal defects noted, no edema            Signed Electronically by: Massimo Moran PA-C

## 2025-07-24 NOTE — NURSING NOTE
Chief Complaint   Patient presents with    Consult     Daughter came back from [place] tested positive for whooping cough, he has no symptoms      Pre-visit Screening:  Immunizations:  not up to date - shingrix at pharmacy  Colonoscopy:  is up to date  Mammogram: NA  Asthma Action Test/Plan:  NA  PHQ9:  NA  GAD7:  NA  Questioned patient about current smoking habits Pt. has never smoked.  Ok to leave detailed message on voice mail for today's visit only Yes, phone # [phone]

## 2025-07-24 NOTE — PATIENT INSTRUCTIONS
Thank you for choosing us for your care. Based on your symptoms and length of illness, I do not think that you need a prescription at this time.  Please follow the care advise I've provided and use the over the counter medications to help relieve your symptoms.   View your full visit summary for details by clicking on the link below.     If you're not feeling better within 2-3 days, please respond to this message and we can consider if a prescription is needed.  You can schedule an appointment right here in [facility], or call [phone]  If the visit is for the same symptoms as your eVisit, we'll refund the cost of your eVisit if seen within seven days.